# Patient Record
Sex: FEMALE | Race: WHITE | HISPANIC OR LATINO | URBAN - METROPOLITAN AREA
[De-identification: names, ages, dates, MRNs, and addresses within clinical notes are randomized per-mention and may not be internally consistent; named-entity substitution may affect disease eponyms.]

---

## 2017-03-03 ENCOUNTER — APPOINTMENT (RX ONLY)
Dept: URBAN - METROPOLITAN AREA CLINIC 349 | Facility: CLINIC | Age: 37
Setting detail: DERMATOLOGY
End: 2017-03-03

## 2017-03-03 DIAGNOSIS — L20.89 OTHER ATOPIC DERMATITIS: ICD-10-CM

## 2017-03-03 DIAGNOSIS — L21.8 OTHER SEBORRHEIC DERMATITIS: ICD-10-CM

## 2017-03-03 DIAGNOSIS — D22 MELANOCYTIC NEVI: ICD-10-CM

## 2017-03-03 PROBLEM — D22.5 MELANOCYTIC NEVI OF TRUNK: Status: ACTIVE | Noted: 2017-03-03

## 2017-03-03 PROBLEM — L30.9 DERMATITIS, UNSPECIFIED: Status: ACTIVE | Noted: 2017-03-03

## 2017-03-03 PROBLEM — J30.1 ALLERGIC RHINITIS DUE TO POLLEN: Status: ACTIVE | Noted: 2017-03-03

## 2017-03-03 PROCEDURE — ? SHAVE REMOVAL

## 2017-03-03 PROCEDURE — 11300 SHAVE SKIN LESION 0.5 CM/<: CPT

## 2017-03-03 PROCEDURE — ? TREATMENT REGIMEN

## 2017-03-03 PROCEDURE — ? COUNSELING

## 2017-03-03 PROCEDURE — 99242 OFF/OP CONSLTJ NEW/EST SF 20: CPT | Mod: 25

## 2017-03-03 PROCEDURE — A4550 SURGICAL TRAYS: HCPCS

## 2017-03-03 PROCEDURE — ? PRESCRIPTION

## 2017-03-03 RX ORDER — BETAMETHASONE DIPROPIONATE 0.5 MG/G
SPRAY TOPICAL
Qty: 1 | Refills: 2 | Status: ERX | COMMUNITY
Start: 2017-03-03

## 2017-03-03 RX ADMIN — BETAMETHASONE DIPROPIONATE: 0.5 SPRAY TOPICAL at 00:00

## 2017-03-03 ASSESSMENT — LOCATION SIMPLE DESCRIPTION DERM
LOCATION SIMPLE: RIGHT UPPER BACK
LOCATION SIMPLE: LEFT TEMPLE
LOCATION SIMPLE: ABDOMEN
LOCATION SIMPLE: RIGHT FOREHEAD
LOCATION SIMPLE: LEFT ANTERIOR NECK
LOCATION SIMPLE: LEFT UPPER BACK
LOCATION SIMPLE: SCALP

## 2017-03-03 ASSESSMENT — LOCATION DETAILED DESCRIPTION DERM
LOCATION DETAILED: LEFT MID-UPPER BACK
LOCATION DETAILED: RIGHT CENTRAL PARIETAL SCALP
LOCATION DETAILED: RIGHT INFERIOR LATERAL FOREHEAD
LOCATION DETAILED: RIGHT MID-UPPER BACK
LOCATION DETAILED: LEFT LATERAL TEMPLE
LOCATION DETAILED: LEFT INFERIOR LATERAL NECK
LOCATION DETAILED: LEFT CENTRAL PARIETAL SCALP
LOCATION DETAILED: LEFT SUPERIOR ANTERIOR NECK
LOCATION DETAILED: LEFT RIB CAGE

## 2017-03-03 ASSESSMENT — LOCATION ZONE DERM
LOCATION ZONE: NECK
LOCATION ZONE: SCALP
LOCATION ZONE: FACE
LOCATION ZONE: TRUNK

## 2017-03-03 ASSESSMENT — SEVERITY ASSESSMENT
SEVERITY: MILD
HOW SEVERE IS THIS PATIENT'S CONDITION?: ALMOST CLEAR

## 2017-03-03 NOTE — PROCEDURE: SHAVE REMOVAL
Post-Care Instructions: I reviewed with the patient in detail post-care instructions. Patient is to keep the biopsy site dry overnight, and then apply Vaseline daily until healed. Patient may apply hydrogen peroxide soaks to remove any crusting. After the procedure, the patient was oriented to person, place, and time. Patient denied feeling dizzy, queasy, and and declined further observation after initial 5 minute observation time.
Bill 31652 For Specimen Handling/Conveyance To Laboratory?: no
Anesthesia Type: 1% lidocaine with epinephrine
Accession #: Path cons
Medical Necessity Clause: This procedure was medically necessary because the lesion has a history of change
Billing Type: Third-Party Bill
Render Post-Care Instructions In Note?: yes
Path Notes (To The Dermatopathologist): Please check margins. History of change.
Hemostasis: Aluminum Chloride
Notification Instructions: Patient will be notified of biopsy results. However, patient instructed to call the office if not contacted within 2 weeks.
Anesthesia Volume In Cc: 0.4
Detail Level: Detailed
Wound Care: Vaseline
Biopsy Method: Dermablade
X Size Of Lesion In Cm (Optional): 0
Consent was obtained from the patient. The risks and benefits to therapy were discussed in detail. Specifically, the risks of infection, scarring, bleeding, prolonged wound healing, incomplete removal, allergy to anesthesia, nerve injury and recurrence were addressed. Prior to the procedure, the treatment site was clearly identified and confirmed by the patient. All components of Universal Protocol/PAUSE Rule completed.
Medical Necessity Information: It is in your best interest to select a reason for this procedure from the list below. All of these items fulfill various CMS LCD requirements except the new and changing color options.

## 2017-03-03 NOTE — PROCEDURE: TREATMENT REGIMEN
Detail Level: Detailed
Samples Given: Sernivo spray
Samples Given: Sernivo spray apply to affected area twice daily x1-2 weeks as needed for flares

## 2017-06-29 ENCOUNTER — RX ONLY (OUTPATIENT)
Age: 37
Setting detail: RX ONLY
End: 2017-06-29

## 2017-06-29 RX ORDER — CLOBETASOL PROPIONATE 0.5 MG/G
AEROSOL, FOAM TOPICAL
Qty: 1 | Refills: 2 | Status: ERX | COMMUNITY
Start: 2017-06-29

## 2017-08-31 RX ORDER — BETAMETHASONE DIPROPIONATE 0.5 MG/G
SPRAY TOPICAL
Qty: 1 | Refills: 2 | Status: CANCELLED
Stop reason: WASHOUT

## 2018-06-29 ENCOUNTER — ANESTHESIA EVENT (OUTPATIENT)
Dept: SURGERY | Age: 38
End: 2018-06-29
Payer: COMMERCIAL

## 2018-06-29 ENCOUNTER — ANESTHESIA (OUTPATIENT)
Dept: SURGERY | Age: 38
End: 2018-06-29
Payer: COMMERCIAL

## 2018-06-29 ENCOUNTER — HOSPITAL ENCOUNTER (OUTPATIENT)
Age: 38
Setting detail: OUTPATIENT SURGERY
Discharge: HOME | End: 2018-06-29
Attending: ORTHOPAEDIC SURGERY | Admitting: ORTHOPAEDIC SURGERY
Payer: COMMERCIAL

## 2018-06-29 VITALS
TEMPERATURE: 97.2 F | SYSTOLIC BLOOD PRESSURE: 134 MMHG | WEIGHT: 108 LBS | OXYGEN SATURATION: 96 % | HEART RATE: 107 BPM | RESPIRATION RATE: 16 BRPM | DIASTOLIC BLOOD PRESSURE: 71 MMHG | BODY MASS INDEX: 18.54 KG/M2

## 2018-06-29 LAB — HCG UR QL: NEGATIVE

## 2018-06-29 PROCEDURE — 77030019605: Performed by: ORTHOPAEDIC SURGERY

## 2018-06-29 PROCEDURE — 77030031139 HC SUT VCRL2 J&J -A: Performed by: ORTHOPAEDIC SURGERY

## 2018-06-29 PROCEDURE — 77030011640 HC PAD GRND REM COVD -A: Performed by: ORTHOPAEDIC SURGERY

## 2018-06-29 PROCEDURE — A4565 SLINGS: HCPCS | Performed by: ORTHOPAEDIC SURGERY

## 2018-06-29 PROCEDURE — 76210000020 HC REC RM PH II FIRST 0.5 HR: Performed by: ORTHOPAEDIC SURGERY

## 2018-06-29 PROCEDURE — 76942 ECHO GUIDE FOR BIOPSY: CPT | Performed by: ORTHOPAEDIC SURGERY

## 2018-06-29 PROCEDURE — 74011250636 HC RX REV CODE- 250/636: Performed by: ANESTHESIOLOGY

## 2018-06-29 PROCEDURE — 76010000161 HC OR TIME 1 TO 1.5 HR INTENSV-TIER 1: Performed by: ORTHOPAEDIC SURGERY

## 2018-06-29 PROCEDURE — 74011000250 HC RX REV CODE- 250

## 2018-06-29 PROCEDURE — 76060000033 HC ANESTHESIA 1 TO 1.5 HR: Performed by: ORTHOPAEDIC SURGERY

## 2018-06-29 PROCEDURE — 77030004453 HC BUR SHV STRY -B: Performed by: ORTHOPAEDIC SURGERY

## 2018-06-29 PROCEDURE — 77030038020 HC MANFLD NEPTUNE STRY -B: Performed by: ORTHOPAEDIC SURGERY

## 2018-06-29 PROCEDURE — 77030018836 HC SOL IRR NACL ICUM -A: Performed by: ORTHOPAEDIC SURGERY

## 2018-06-29 PROCEDURE — 77030006891 HC BLD SHV RESECT STRY -B: Performed by: ORTHOPAEDIC SURGERY

## 2018-06-29 PROCEDURE — 77030003602 HC NDL NRV BLK BBMI -B: Performed by: ANESTHESIOLOGY

## 2018-06-29 PROCEDURE — 74011250637 HC RX REV CODE- 250/637: Performed by: ANESTHESIOLOGY

## 2018-06-29 PROCEDURE — 77030032490 HC SLV COMPR SCD KNE COVD -B: Performed by: ORTHOPAEDIC SURGERY

## 2018-06-29 PROCEDURE — 74011250636 HC RX REV CODE- 250/636: Performed by: ORTHOPAEDIC SURGERY

## 2018-06-29 PROCEDURE — 76210000006 HC OR PH I REC 0.5 TO 1 HR: Performed by: ORTHOPAEDIC SURGERY

## 2018-06-29 PROCEDURE — 77030027384 HC PRB ARTHSCP SERFAS STRY -C: Performed by: ORTHOPAEDIC SURGERY

## 2018-06-29 PROCEDURE — 74011250636 HC RX REV CODE- 250/636

## 2018-06-29 PROCEDURE — 77030002933 HC SUT MCRYL J&J -A: Performed by: ORTHOPAEDIC SURGERY

## 2018-06-29 PROCEDURE — 77030003666 HC NDL SPINAL BD -A: Performed by: ORTHOPAEDIC SURGERY

## 2018-06-29 PROCEDURE — 81025 URINE PREGNANCY TEST: CPT

## 2018-06-29 PROCEDURE — 76010010054 HC POST OP PAIN BLOCK: Performed by: ORTHOPAEDIC SURGERY

## 2018-06-29 PROCEDURE — 77030020143 HC AIRWY LARYN INTUB CGAS -A: Performed by: ANESTHESIOLOGY

## 2018-06-29 PROCEDURE — 77030016570 HC BLNKT BAIR HGGR 3M -B: Performed by: ANESTHESIOLOGY

## 2018-06-29 RX ORDER — ONDANSETRON 2 MG/ML
INJECTION INTRAMUSCULAR; INTRAVENOUS AS NEEDED
Status: DISCONTINUED | OUTPATIENT
Start: 2018-06-29 | End: 2018-06-29 | Stop reason: HOSPADM

## 2018-06-29 RX ORDER — LIDOCAINE HYDROCHLORIDE 20 MG/ML
INJECTION, SOLUTION EPIDURAL; INFILTRATION; INTRACAUDAL; PERINEURAL AS NEEDED
Status: DISCONTINUED | OUTPATIENT
Start: 2018-06-29 | End: 2018-06-29 | Stop reason: HOSPADM

## 2018-06-29 RX ORDER — SODIUM CHLORIDE, SODIUM LACTATE, POTASSIUM CHLORIDE, CALCIUM CHLORIDE 600; 310; 30; 20 MG/100ML; MG/100ML; MG/100ML; MG/100ML
75 INJECTION, SOLUTION INTRAVENOUS CONTINUOUS
Status: DISCONTINUED | OUTPATIENT
Start: 2018-06-29 | End: 2018-06-29 | Stop reason: HOSPADM

## 2018-06-29 RX ORDER — ONDANSETRON 2 MG/ML
4 INJECTION INTRAMUSCULAR; INTRAVENOUS ONCE
Status: COMPLETED | OUTPATIENT
Start: 2018-06-29 | End: 2018-06-29

## 2018-06-29 RX ORDER — CEFAZOLIN SODIUM/WATER 2 G/20 ML
2 SYRINGE (ML) INTRAVENOUS
Status: COMPLETED | OUTPATIENT
Start: 2018-06-29 | End: 2018-06-29

## 2018-06-29 RX ORDER — FENTANYL CITRATE 50 UG/ML
INJECTION, SOLUTION INTRAMUSCULAR; INTRAVENOUS AS NEEDED
Status: DISCONTINUED | OUTPATIENT
Start: 2018-06-29 | End: 2018-06-29 | Stop reason: HOSPADM

## 2018-06-29 RX ORDER — SODIUM CHLORIDE 0.9 % (FLUSH) 0.9 %
5-10 SYRINGE (ML) INJECTION AS NEEDED
Status: DISCONTINUED | OUTPATIENT
Start: 2018-06-29 | End: 2018-06-29 | Stop reason: HOSPADM

## 2018-06-29 RX ORDER — NALOXONE HYDROCHLORIDE 0.4 MG/ML
0.2 INJECTION, SOLUTION INTRAMUSCULAR; INTRAVENOUS; SUBCUTANEOUS AS NEEDED
Status: DISCONTINUED | OUTPATIENT
Start: 2018-06-29 | End: 2018-06-29 | Stop reason: HOSPADM

## 2018-06-29 RX ORDER — FAMOTIDINE 20 MG/1
20 TABLET, FILM COATED ORAL ONCE
Status: COMPLETED | OUTPATIENT
Start: 2018-06-29 | End: 2018-06-29

## 2018-06-29 RX ORDER — PROPOFOL 10 MG/ML
INJECTION, EMULSION INTRAVENOUS AS NEEDED
Status: DISCONTINUED | OUTPATIENT
Start: 2018-06-29 | End: 2018-06-29 | Stop reason: HOSPADM

## 2018-06-29 RX ORDER — EPINEPHRINE 1 MG/ML
INJECTION, SOLUTION, CONCENTRATE INTRAVENOUS AS NEEDED
Status: DISCONTINUED | OUTPATIENT
Start: 2018-06-29 | End: 2018-06-29 | Stop reason: HOSPADM

## 2018-06-29 RX ORDER — DEXAMETHASONE SODIUM PHOSPHATE 4 MG/ML
INJECTION, SOLUTION INTRA-ARTICULAR; INTRALESIONAL; INTRAMUSCULAR; INTRAVENOUS; SOFT TISSUE AS NEEDED
Status: DISCONTINUED | OUTPATIENT
Start: 2018-06-29 | End: 2018-06-29 | Stop reason: HOSPADM

## 2018-06-29 RX ORDER — MIDAZOLAM HYDROCHLORIDE 1 MG/ML
2 INJECTION, SOLUTION INTRAMUSCULAR; INTRAVENOUS
Status: COMPLETED | OUTPATIENT
Start: 2018-06-29 | End: 2018-06-29

## 2018-06-29 RX ORDER — LIDOCAINE HYDROCHLORIDE 10 MG/ML
0.1 INJECTION INFILTRATION; PERINEURAL AS NEEDED
Status: DISCONTINUED | OUTPATIENT
Start: 2018-06-29 | End: 2018-06-29 | Stop reason: HOSPADM

## 2018-06-29 RX ORDER — KETOROLAC TROMETHAMINE 30 MG/ML
INJECTION, SOLUTION INTRAMUSCULAR; INTRAVENOUS AS NEEDED
Status: DISCONTINUED | OUTPATIENT
Start: 2018-06-29 | End: 2018-06-29 | Stop reason: HOSPADM

## 2018-06-29 RX ADMIN — MIDAZOLAM HYDROCHLORIDE 2 MG: 1 INJECTION, SOLUTION INTRAMUSCULAR; INTRAVENOUS at 06:44

## 2018-06-29 RX ADMIN — ONDANSETRON 4 MG: 2 INJECTION INTRAMUSCULAR; INTRAVENOUS at 09:05

## 2018-06-29 RX ADMIN — FENTANYL CITRATE 25 MCG: 50 INJECTION, SOLUTION INTRAMUSCULAR; INTRAVENOUS at 08:08

## 2018-06-29 RX ADMIN — LIDOCAINE HYDROCHLORIDE 20 MG: 20 INJECTION, SOLUTION EPIDURAL; INFILTRATION; INTRACAUDAL; PERINEURAL at 07:22

## 2018-06-29 RX ADMIN — ONDANSETRON 4 MG: 2 INJECTION INTRAMUSCULAR; INTRAVENOUS at 07:38

## 2018-06-29 RX ADMIN — KETOROLAC TROMETHAMINE 30 MG: 30 INJECTION, SOLUTION INTRAMUSCULAR; INTRAVENOUS at 08:22

## 2018-06-29 RX ADMIN — FENTANYL CITRATE 25 MCG: 50 INJECTION, SOLUTION INTRAMUSCULAR; INTRAVENOUS at 08:13

## 2018-06-29 RX ADMIN — FAMOTIDINE 20 MG: 20 TABLET, FILM COATED ORAL at 06:41

## 2018-06-29 RX ADMIN — FENTANYL CITRATE 50 MCG: 50 INJECTION, SOLUTION INTRAMUSCULAR; INTRAVENOUS at 07:22

## 2018-06-29 RX ADMIN — PROPOFOL 200 MG: 10 INJECTION, EMULSION INTRAVENOUS at 07:22

## 2018-06-29 RX ADMIN — DEXAMETHASONE SODIUM PHOSPHATE 4 MG: 4 INJECTION, SOLUTION INTRA-ARTICULAR; INTRALESIONAL; INTRAMUSCULAR; INTRAVENOUS; SOFT TISSUE at 07:38

## 2018-06-29 RX ADMIN — Medication 2 G: at 07:14

## 2018-06-29 RX ADMIN — SODIUM CHLORIDE, SODIUM LACTATE, POTASSIUM CHLORIDE, AND CALCIUM CHLORIDE 75 ML/HR: 600; 310; 30; 20 INJECTION, SOLUTION INTRAVENOUS at 06:00

## 2018-06-29 NOTE — ANESTHESIA PREPROCEDURE EVALUATION
Anesthetic History     PONV          Review of Systems / Medical History  Patient summary reviewed, nursing notes reviewed and pertinent labs reviewed    Pulmonary  Within defined limits                 Neuro/Psych   Within defined limits           Cardiovascular                  Exercise tolerance: >4 METS     GI/Hepatic/Renal  Within defined limits              Endo/Other  Within defined limits           Other Findings              Physical Exam    Airway  Mallampati: I  TM Distance: 4 - 6 cm  Neck ROM: normal range of motion   Mouth opening: Normal     Cardiovascular    Rhythm: regular           Dental  No notable dental hx       Pulmonary  Breath sounds clear to auscultation               Abdominal         Other Findings            Anesthetic Plan    ASA: 1  Anesthesia type: general      Post-op pain plan if not by surgeon: peripheral nerve block single    Induction: Intravenous  Anesthetic plan and risks discussed with: Patient

## 2018-06-29 NOTE — OP NOTES
[facility] REPORT    Hernandez Garrett  MR#: [id number]  : 1980  ACCOUNT #: [de-identified]   DATE OF SERVICE: 2018    PREOPERATIVE DIAGNOSIS:  Acromioclavicular joint arthritis with possible rotator cuff tear. POSTOPERATIVE DIAGNOSES:   1. Acromioclavicular joint arthritis. 2.  Small bursal tear of the rotator cuff. 3.  Impingement of the right shoulder. PROCEDURES PERFORMED:   1. Arthroscopic resection of distal clavicle. 2.  Debridement of a very small bursal tear of the right shoulder. 3.  Subacromial decompression. SURGEON:  Jj Lopez MD    ASSISTANT:  None. ANESTHESIA:  General.    ESTIMATED BLOOD LOSS:  Minimal.    SPECIMENS REMOVED:  None. COMPLICATIONS:  None. IMPLANTS:  None. DESCRIPTION OF PROCEDURE:  After an adequate level of general anesthesia was obtained, the right arm was prepped and draped in the usual sterile fashion. She had full range of motion and had a block per Anesthesia for postoperative pain management, and received antibiotics. The joint was distended posteriorly. The patient had marked pain at her [facility] joint with very prominent AC joint. She was somewhat thin and had somewhat of a prominent distal clavicle and pain at the [facility] joint. The joint was distended posteriorly with saline. Arthroscope introduced. The articular surface looked fine. An anterior portal was made and the biceps anchor looked fine. There were no labral lesions. The arthroscope was placed in the subacromial space. A lateral portal was made. Patient had some hooking of the acromion and a decompression was performed. The rotator cuff was well visualized and intact. She had some small bursal tearing, but overall this area was debrided, but she had excellent integrity of the rotator cuff. The main problem was on the distal clavicle. She was very thin and she had a prominent end of the distal clavicle.   Soft tissue was removed circumferentially. She had somewhat of an oblique orientation of the distal clavicle, particularly on the acromial side. Initially, with the arthroscope in the posterior portal and through the lateral portal, bone was removed inferiorly. The proper depth was determined. Then, through the anterior portal with pressure applied superiorly, the bone was removed from both sides of the joint. A small incision was made superiorly to use as a windshield wiper method to remove some of the other spurs. Arthroscope was placed through the 3 portals and it looked as though there was an ample amount of bone removed, particularly anteriorly and posteriorly. The wound was then copiously irrigated. The portals closed with Monocryl and Monocryl subcuticular stitches, placed Steri-Strips and sterile dressings applied. The patient was placed in a sling. She tolerated the procedure well.       MD Roseanne Caraballo / OZZIE  D: 06/29/2018 08:26     T: 06/29/2018 08:54  JOB #: [id number]  CC: Keyshawn Kaur MD

## 2018-06-29 NOTE — ANESTHESIA POSTPROCEDURE EVALUATION
Post-Anesthesia Evaluation and Assessment    Patient: Sarah Sheriff MRN: [id number]  SSN: [id number]    YOB: 1980  Age: 41 y.o. Sex: female       Cardiovascular Function/Vital Signs  Visit Vitals    /71    Pulse (!) 107    Temp 36.2 °C (97.2 °F)    Resp 16    Wt 49 kg (108 lb)    SpO2 96%    BMI 18.54 kg/m2       Patient is status post general anesthesia for Procedure(s):  RIGHT SHOULDER ARTHROSCOPY DISTAL CLAVICLE RESECTION DEBRIDEMENT ROTATOR CUFF TEAR. Nausea/Vomiting: None    Postoperative hydration reviewed and adequate. Pain:  Pain Scale 1: Numeric (0 - 10) (06/29/18 0834)  Pain Intensity 1: 0 (06/29/18 0834)   Managed    Neurological Status:   Neuro (WDL): Exceptions to [facility] (06/29/18 3272)  Neuro  Neurologic State: Anesthetized; Appropriate for age;Drowsy (06/29/18 0834)  RUE Motor Response: Floppy (06/29/18 0834)   At baseline    Mental Status and Level of Consciousness: Arousable    Pulmonary Status:   O2 Device: Nasal cannula (06/29/18 5652)   Adequate oxygenation and airway patent    Complications related to anesthesia: None    Post-anesthesia assessment completed.  No concerns    Signed By: Eze Sow MD     June 29, 2018

## 2018-06-29 NOTE — IP AVS SNAPSHOT
Rudy Fernandez 
 
 
 [facility] [address] [facility] 
[phone] Patient: Hugh Estes MRN: [id number] LVX:0/70/6706 About your hospitalization You were admitted on:  June 29, 2018 You last received care in the:  [facility] OP PACU You were discharged on:  June 29, 2018 Why you were hospitalized Your primary diagnosis was:  Not on File Follow-up Information None Discharge Orders None A check addy indicates which time of day the medication should be taken. My Medications ASK your doctor about these medications Instructions Each Dose to Equal  
 Morning Noon Evening Bedtime EUCRISA 2 % Oint Generic drug:  crisaborole Your last dose was: Your next dose is:    
   
   
 by Apply Externally route. ibuprofen 800 mg tablet Commonly known as:  MOTRIN Your last dose was: Your next dose is: Take 800 mg by mouth every six (6) hours as needed for Pain. 800 mg  
    
   
   
   
  
 levonorgestrel-ethinyl estradiol 0.1-20 mg-mcg Tab Commonly known as:  Jim Linton (28) Your last dose was: Your next dose is: Take 1 Tab by mouth daily. 1 Tab SERNIVO 0.05 % Sprp Generic drug:  betamethasone dipropionate Your last dose was: Your next dose is:    
   
   
 by Apply Externally route. ZyrTEC 10 mg Cap Generic drug:  Cetirizine Your last dose was: Your next dose is: Take  by mouth as needed. Discharge Instructions S 
ACTIVITY 
- Use sling at all times while numb. . Must sleep in sling until full sensation and control returns. May remove sling only when sensation and control returns. Range of motion as tolerated - Limited activity today; increase activity tomorrow, but no vigorous exercise. .Protect arm while numb. -  Keep dressing dry and intact for 3 days. May remove and cover with waterproof band aids to shower. No tub baths for 2 weeks. - Physical therapy as scheduled. - Ice pack to area DIET 
- Clear liquids until no nausea or vomiting. 
- Advance to regular diet as tolerated. Avoid greasy and spicy food today to reduce nausea. PAIN 
- Expect a moderate amount of pain. Begin taking pain pills when sensation returns or at dinner/ bedtime even if still numb. - Take pain medication as directed by your doctor. If no prescription for pain is sent home with you, take the appropriate dose of your commonly used pain medication. 
- Call your doctor if pain is NOT relieved by medication. - DO NOT take aspirin or blood thinners until directed by your doctor. Take pills with food to reduce nausea May cause constipation Use stool softener DRESSING CARE 
- Keep dressing clean and dry for 3 days. May remove and cover with waterproof band aids to shower. No tub baths for 2 weeks. FOLLOW UP PHONE CALL 
- Call will be made by nursing staff. - If you have any problems or concerns, call your doctor as needed. CALL YOUR DOCTOR IF 
- Excessive bleeding that does not stop after holding mild pressure over the area for 15 minutes. - Any color, temperature, or sensation changes in the operative arm/hand. 
- Temperature of 101F degrees or above. - Green or yellow, smelly drainage. 
- Nausea and vomiting that does not stop by bedtime. AFTER ANESTHESIA 
 - For the next 24 hours:  DO NOT Drive; Drink Alcoholic Beverages; Make important decisions. 
- Be aware of dizziness following anesthesia and while taking pain medication. 
- Plan to stay within one hour's drive of the hospital. 
 
Neno Henderson DATE/TIME__Keep scheduled appointment ___________________________________ YOUR DOCTOR'S PHONE NUMBER____[phone]__________________ Introducing [facility]! Dear [facility]: Thank you for requesting a [employer] account. Our records indicate that you already have an active [employer] account. You can access your account anytime at [email or web address] Did you know that you can access your hospital and ER discharge instructions at any time in [employer]? You can also review all of your test results from your hospital stay or ER visit. Additional Information If you have questions, please visit the Frequently Asked Questions section of the [employer] website at [email or web address] Remember, [employer] is NOT to be used for urgent needs. For medical emergencies, dial 911. Now available from your iPhone and Android! Introducing Tony Minor As a [employer] patient, I wanted to make you aware of our electronic visit tool called Tony Iván. [email or web address] allows you to connect within minutes with a medical provider 24 hours a day, seven days a week via a mobile device or tablet or logging into a secure website from your computer. You can access Tony Nievesfin from anywhere in the [place]. A virtual visit might be right for you when you have a simple condition and feel like you just dont want to get out of bed, or cant get away from work for an appointment, when your regular [place] provider is not available (evenings, weekends or holidays), or when youre out of town and need minor care. Electronic visits cost only $49 and if the [email or web address] provider determines a prescription is needed to treat your condition, one can be electronically transmitted to a nearby pharmacy*. Please take a moment to enroll today if you have not already done so. The enrollment process is free and takes just a few minutes. To enroll, please download the [email or web address] ariana to your tablet or phone, or visit [email or web address] to enroll on your computer. And, as an [address] [place] [address] patient with a [employer] account, the results of your visits will be scanned into your electronic medical record and your primary care provider will be able to view the scanned results. We urge you to continue to see your regular [facility] provider for your ongoing medical care. And while your primary care provider may not be the one available when you seek a Tony Minor virtual visit, the peace of mind you get from getting a real diagnosis real time can be priceless. For more information on Tony Nievesfin, view our Frequently Asked Questions (FAQs) at [email or web address]. Sincerely, 
 
Mary Nelson MD 
Chief Medical Officer Vinay Davis *:  certain medications cannot be prescribed via Tony Ezequielfin Providers Seen During Your Hospitalization Provider Specialty Primary office phone Myla Terry MD Orthopedic Surgery [phone] Your Primary Care Physician (PCP) Primary Care Physician Office Phone Office Fax NONE ** None ** ** None ** You are allergic to the following Allergen Reactions Codeine Shortness of Breath Imitrex (Sumatriptan Succinate) Hives Shortness of Breath Duradrin (Abzhmrs-Mngqqemjs-Gdckmkvnvlod) Hives Erythromycin Hives Morphine Hives Percocet (Oxycodone-Acetaminophen) Hives Tetracycline Hives Ultram (Tramadol) Hives Vicodin (Hydrocodone-Acetaminophen) Hives Recent Documentation Weight BMI OB Status Smoking Status 49 kg 18.54 kg/m2 Having regular periods Never Smoker Emergency Contacts Name Discharge Info Relation Home Work Mobile Surjit Fernandes  Spouse [3] [phone] Patient Belongings  The following personal items are in your possession at time of discharge: 
  Dental Appliances: None         Home Medications: None   Jewelry: None Clothing: Footwear, Undergarments, Pants, Shirt    Other Valuables: None Please provide this summary of care documentation to your next provider. Signatures-by signing, you are acknowledging that this After Visit Summary has been reviewed with you and you have received a copy. Patient Signature:  ____________________________________________________________ Date:  ____________________________________________________________  
  
Dontrell Ala Provider Signature:  ____________________________________________________________ Date:  ____________________________________________________________

## 2018-06-29 NOTE — H&P
Outpatient Surgery History and Physical:  Zhanna Travis was seen and examined. CHIEF COMPLAINT:    r shoulder . PE:     Visit Vitals    /75 (BP 1 Location: Left arm, BP Patient Position: Supine)    Pulse 94    Temp 98.4 °F (36.9 °C)    Resp 18    Wt 49 kg (108 lb)    LMP 06/19/2018    SpO2 99%    BMI 18.54 kg/m2       Heart:   Regular rhythm      Lungs:  Are clear      Past Medical History:    Patient Active Problem List    Diagnosis    Routine gynecological examination    Fatigue    Frequent headaches       Surgical History:   Past Surgical History:   Procedure Laterality Date    HX BREAST AUGMENTATION      HX KNEE ARTHROSCOPY Left     x 2    HX ORTHOPAEDIC      HX WISDOM TEETH EXTRACTION         Social History: Patient  reports that she has never smoked. She has never used smokeless tobacco. She reports that she drinks alcohol. She reports that she does not use illicit drugs. Family History:   Family History   Problem Relation Age of Onset    Migraines Mother     Breast Cancer Maternal Grandmother     Stroke Maternal Grandmother     Deep Vein Thrombosis Paternal Grandmother     Diabetes Paternal Grandfather    Belden Marrero Bladder Disease Paternal Grandfather        Allergies: Reviewed per EMR  Allergies   Allergen Reactions    Codeine Shortness of Breath    Imitrex [Sumatriptan Succinate] Hives and Shortness of Breath    Duradrin [Kiqlrhb-Guaqqfgaf-Adaoegvvxrcu] Hives    Erythromycin Hives    Morphine Hives    Percocet [Oxycodone-Acetaminophen] Hives    Tetracycline Hives    Ultram [Tramadol] Hives    Vicodin [Hydrocodone-Acetaminophen] Hives       Medications:    No current facility-administered medications on file prior to encounter. Current Outpatient Prescriptions on File Prior to Encounter   Medication Sig    crisaborole (EUCRISA) 2 % oint by Apply Externally route.  betamethasone dipropionate (SERNIVO) 0.05 % sprp by Apply Externally route.     levonorgestrel-ethinyl estradiol (LUTERA, 28,) 0.1-20 mg-mcg tab Take 1 Tab by mouth daily. (Patient taking differently: Take 1 Tab by mouth nightly.)    Cetirizine (ZYRTEC) 10 mg cap Take  by mouth as needed. The surgery is planned for the  Shoulder . History and physical has been reviewed. The patient has been examined. There have been no significant clinical changes since the completion of the originally dated History and Physical.  Patient identified by surgeon; surgical site was confirmed by patient and surgeon. The patient is here today for outpatient surgery. I have examined the patient, no changes are noted in the patient's medical status. Necessity for the procedure/care is still present and the history and physical above is current. See the office notes for the full long term history of the problem. Please see the recent office notes for the musculoskeletal examination.     Signed By: Nash Rossi MD     June 29, 2018 6:47 AM

## 2018-06-29 NOTE — ANESTHESIA PROCEDURE NOTES
Interscalene Block    Start time: 6/29/2018 6:44 AM  End time: 6/29/2018 6:48 AM  Performed by: Masood Villalta  Authorized by: Masood Villalta       Pre-procedure: Indications: at surgeon's request and post-op pain management    Preanesthetic Checklist: patient identified, risks and benefits discussed, site marked, timeout performed, anesthesia consent given and patient being monitored      Block Type:   Block Type:   Interscalene  Laterality:  Right  Monitoring:  Standard ASA monitoring, responsive to questions, oxygen, continuous pulse ox, frequent vital sign checks and heart rate  Injection Technique:  Single shot  Procedures: ultrasound guided    Patient Position: seated  Prep: chlorhexidine    Location:  Interscalene  Needle Type:  Stimuplex  Needle Gauge:  22 G  Needle Localization:  Ultrasound guidance  Medication Injected:  0.375%  bupivacaine  Adds:  Epi 1:400K  Volume (mL):  20  mepivacaine  Volume (mL):  5    Assessment:  Number of attempts:  1  Injection Assessment:  Incremental injection every 5 mL, negative aspiration for CSF, no paresthesia, ultrasound image on chart, no intravascular symptoms, negative aspiration for blood and local visualized surrounding nerve on ultrasound  Patient tolerance:  Patient tolerated the procedure well with no immediate complications

## 2018-06-29 NOTE — IP AVS SNAPSHOT
[address] 
[phone] Patient: Skye Ramey MRN: [id number] CWP:9/30/7740 A check addy indicates which time of day the medication should be taken. My Medications ASK your doctor about these medications Instructions Each Dose to Equal  
 Morning Noon Evening Bedtime EUCRISA 2 % Oint Generic drug:  crisaborole Your last dose was: Your next dose is:    
   
   
 by Apply Externally route. ibuprofen 800 mg tablet Commonly known as:  MOTRIN Your last dose was: Your next dose is: Take 800 mg by mouth every six (6) hours as needed for Pain. 800 mg  
    
   
   
   
  
 levonorgestrel-ethinyl estradiol 0.1-20 mg-mcg Tab Commonly known as:  Avelina Sauer (28) Your last dose was: Your next dose is: Take 1 Tab by mouth daily. 1 Tab SERNIVO 0.05 % Sprp Generic drug:  betamethasone dipropionate Your last dose was: Your next dose is:    
   
   
 by Apply Externally route. ZyrTEC 10 mg Cap Generic drug:  Cetirizine Your last dose was: Your next dose is: Take  by mouth as needed.

## 2018-06-29 NOTE — IP AVS SNAPSHOT
Summary of Care Report The Summary of Care report has been created to help improve care coordination. Users with access to [employer] or [address] (Web-based application) may access additional patient information including the Discharge Summary. If you are not currently a [address] user and need more information, please call the number listed below in the [place] [address] section and ask to be connected with Medical Records. Facility Information Name Address Phone [address] [address] [address] [phone] Patient Information Patient Name Sex  Venkat Silveira ([id number]) Female 1980 Discharge Information Admitting Provider Service Area Unit Bennie Flynn MD / [phone] [facility] / [phone] Discharge Provider Discharge Date/Time Discharge Disposition Destination (none) (none) (none) (none) Patient Language Language ENGLISH [13] Hospital Problems as of 2018  Reviewed: 2018  3:56 PM by Kirk Lee MD  
 None Non-Hospital Problems as of 2018  Reviewed: 2018  3:56 PM by Kikr Lee MD  
  
  
  
 Class Noted - Resolved Last Modified Active Problems Routine gynecological examination  2015 - Present 2015 by Kirk Lee MD  
  Entered by Kirk Lee MD  
  Fatigue  2015 - Present 2015 by Kirk Lee MD  
  Entered by Kirk Lee MD  
  Frequent headaches  2015 - Present 2015 by Kirk Lee MD  
  Entered by Kirk Lee MD  
  
You are allergic to the following Allergen Reactions Codeine Shortness of Breath Imitrex (Sumatriptan Succinate) Hives Shortness of Breath Duradrin (Tgqenqe-Drnqxxbva-Tmccxowxggat) Hives Erythromycin Hives Morphine Hives Percocet (Oxycodone-Acetaminophen) Hives Tetracycline Hives Ultram (Tramadol) Hives Vicodin (Hydrocodone-Acetaminophen) Hives Current Discharge Medication List  
  
ASK your doctor about these medications Dose & Instructions Dispensing Information Comments EUCRISA 2 % Oint Generic drug:  crisaborole  
 by Apply Externally route. Refills:  0  
   
 ibuprofen 800 mg tablet Commonly known as:  MOTRIN Dose:  800 mg Take 800 mg by mouth every six (6) hours as needed for Pain. Refills:  0  
   
 levonorgestrel-ethinyl estradiol 0.1-20 mg-mcg Tab Commonly known as:  Monty Schafer (28) Dose:  1 Tab Take 1 Tab by mouth daily. Quantity:  3 Package Refills:  4 SERNIVO 0.05 % Sprp Generic drug:  betamethasone dipropionate  
 by Apply Externally route. Refills:  0 ZyrTEC 10 mg Cap Generic drug:  Cetirizine Take  by mouth as needed. Refills:  0 Surgery Information ID Date/Time Status Primary Surgeon All Procedures Location [id number] 6/29/2018 0730 Unposted Ann Marie Murillo MD RIGHT SHOULDER ARTHROSCOPY DISTAL CLAVICLE RESECTION DEBRIDEMENT ROTATOR CUFF TEAR AMRIT Johnson Follow-up Information None Discharge Instructions S 
ACTIVITY 
- Use sling at all times while numb. . Must sleep in sling until full sensation and control returns. May remove sling only when sensation and control returns. Range of motion as tolerated - Limited activity today; increase activity tomorrow, but no vigorous exercise. .Protect arm while numb. -  Keep dressing dry and intact for 3 days. May remove and cover with waterproof band aids to shower. No tub baths for 2 weeks. - Physical therapy as scheduled. - Ice pack to area DIET 
- Clear liquids until no nausea or vomiting. 
- Advance to regular diet as tolerated. Avoid greasy and spicy food today to reduce nausea.  
 
PAIN 
 - Expect a moderate amount of pain. Begin taking pain pills when sensation returns or at dinner/ bedtime even if still numb. - Take pain medication as directed by your doctor. If no prescription for pain is sent home with you, take the appropriate dose of your commonly used pain medication. 
- Call your doctor if pain is NOT relieved by medication. - DO NOT take aspirin or blood thinners until directed by your doctor. Take pills with food to reduce nausea May cause constipation Use stool softener DRESSING CARE 
- Keep dressing clean and dry for 3 days. May remove and cover with waterproof band aids to shower. No tub baths for 2 weeks. FOLLOW UP PHONE CALL 
- Call will be made by nursing staff. - If you have any problems or concerns, call your doctor as needed. CALL YOUR DOCTOR IF 
- Excessive bleeding that does not stop after holding mild pressure over the area for 15 minutes. - Any color, temperature, or sensation changes in the operative arm/hand. 
- Temperature of 101F degrees or above. - Green or yellow, smelly drainage. 
- Nausea and vomiting that does not stop by bedtime. AFTER ANESTHESIA 
 - For the next 24 hours:  DO NOT Drive; Drink Alcoholic Beverages; Make important decisions. 
- Be aware of dizziness following anesthesia and while taking pain medication. 
- Plan to stay within one hour's drive of the hospital. 
 
Geeta Bias DATE/TIME__Keep scheduled appointment ___________________________________ YOUR DOCTOR'S PHONE NUMBER____[phone]__________________ Chart Review Routing History No Routing History on File

## 2018-09-08 ENCOUNTER — APPOINTMENT (RX ONLY)
Dept: URBAN - METROPOLITAN AREA CLINIC 349 | Facility: CLINIC | Age: 38
Setting detail: DERMATOLOGY
End: 2018-09-08

## 2018-09-08 DIAGNOSIS — L21.8 OTHER SEBORRHEIC DERMATITIS: ICD-10-CM

## 2018-09-08 DIAGNOSIS — Z87.2 PERSONAL HISTORY OF DISEASES OF THE SKIN AND SUBCUTANEOUS TISSUE: ICD-10-CM

## 2018-09-08 DIAGNOSIS — L20.89 OTHER ATOPIC DERMATITIS: ICD-10-CM

## 2018-09-08 DIAGNOSIS — D22 MELANOCYTIC NEVI: ICD-10-CM | Status: STABLE

## 2018-09-08 PROCEDURE — ? TREATMENT REGIMEN

## 2018-09-08 PROCEDURE — ? PRESCRIPTION

## 2018-09-08 PROCEDURE — ? COUNSELING

## 2018-09-08 ASSESSMENT — LOCATION ZONE DERM
LOCATION ZONE: SCALP
LOCATION ZONE: ARM
LOCATION ZONE: TRUNK
LOCATION ZONE: FACE
LOCATION ZONE: LEG

## 2018-09-08 ASSESSMENT — LOCATION DETAILED DESCRIPTION DERM
LOCATION DETAILED: LEFT PROXIMAL DORSAL FOREARM
LOCATION DETAILED: LEFT DISTAL POSTERIOR THIGH
LOCATION DETAILED: LEFT CENTRAL PARIETAL SCALP
LOCATION DETAILED: LEFT LATERAL TEMPLE
LOCATION DETAILED: LEFT RIB CAGE
LOCATION DETAILED: RIGHT CENTRAL PARIETAL SCALP
LOCATION DETAILED: RIGHT DISTAL POSTERIOR THIGH
LOCATION DETAILED: RIGHT SUPERIOR MEDIAL UPPER BACK
LOCATION DETAILED: RIGHT INFERIOR LATERAL FOREHEAD
LOCATION DETAILED: RIGHT PROXIMAL DORSAL FOREARM

## 2018-09-08 ASSESSMENT — LOCATION SIMPLE DESCRIPTION DERM
LOCATION SIMPLE: RIGHT POSTERIOR THIGH
LOCATION SIMPLE: LEFT POSTERIOR THIGH
LOCATION SIMPLE: LEFT FOREARM
LOCATION SIMPLE: SCALP
LOCATION SIMPLE: RIGHT FOREARM
LOCATION SIMPLE: RIGHT UPPER BACK
LOCATION SIMPLE: ABDOMEN
LOCATION SIMPLE: LEFT TEMPLE
LOCATION SIMPLE: RIGHT FOREHEAD

## 2018-09-08 NOTE — PROCEDURE: TREATMENT REGIMEN
Samples Given: Sernivo spray
Detail Level: Detailed
Samples Given: Sernivo spray apply to affected area twice daily x1-2 weeks as needed for flares

## 2019-05-15 PROBLEM — G43.101 MIGRAINE WITH AURA AND WITH STATUS MIGRAINOSUS, NOT INTRACTABLE: Status: ACTIVE | Noted: 2019-05-15

## 2020-06-15 ENCOUNTER — HOSPITAL ENCOUNTER (OUTPATIENT)
Dept: MAMMOGRAPHY | Age: 40
Discharge: HOME | End: 2020-06-15
Attending: OBSTETRICS & GYNECOLOGY
Payer: COMMERCIAL

## 2020-06-15 DIAGNOSIS — Z12.31 ENCOUNTER FOR SCREENING MAMMOGRAM FOR BREAST CANCER: ICD-10-CM

## 2020-06-15 DIAGNOSIS — Z01.419 WELL WOMAN EXAM WITH ROUTINE GYNECOLOGICAL EXAM: ICD-10-CM

## 2020-06-15 PROCEDURE — 77067 SCR MAMMO BI INCL CAD: CPT

## 2021-06-28 ENCOUNTER — HOSPITAL ENCOUNTER (OUTPATIENT)
Dept: MAMMOGRAPHY | Age: 41
Discharge: HOME | End: 2021-06-28
Attending: OBSTETRICS & GYNECOLOGY
Payer: COMMERCIAL

## 2021-06-28 DIAGNOSIS — Z12.31 ENCOUNTER FOR SCREENING MAMMOGRAM FOR BREAST CANCER: ICD-10-CM

## 2021-06-28 DIAGNOSIS — Z01.419 WELL WOMAN EXAM WITH ROUTINE GYNECOLOGICAL EXAM: ICD-10-CM

## 2021-06-28 PROCEDURE — 77067 SCR MAMMO BI INCL CAD: CPT

## 2022-03-18 PROBLEM — G43.101 MIGRAINE WITH AURA AND WITH STATUS MIGRAINOSUS, NOT INTRACTABLE: Status: ACTIVE | Noted: 2019-05-15

## 2022-06-22 ENCOUNTER — OFFICE VISIT (OUTPATIENT)
Dept: OBGYN CLINIC | Age: 42
End: 2022-06-22
Payer: COMMERCIAL

## 2022-06-22 VITALS
SYSTOLIC BLOOD PRESSURE: 100 MMHG | DIASTOLIC BLOOD PRESSURE: 60 MMHG | HEIGHT: 64 IN | BODY MASS INDEX: 18.95 KG/M2 | WEIGHT: 111 LBS

## 2022-06-22 DIAGNOSIS — Z11.51 ENCOUNTER FOR SCREENING FOR HUMAN PAPILLOMAVIRUS (HPV): ICD-10-CM

## 2022-06-22 DIAGNOSIS — Z01.419 WELL WOMAN EXAM WITH ROUTINE GYNECOLOGICAL EXAM: Primary | ICD-10-CM

## 2022-06-22 DIAGNOSIS — Z13.89 SCREENING FOR HEMATURIA OR PROTEINURIA: ICD-10-CM

## 2022-06-22 DIAGNOSIS — Z12.31 ENCOUNTER FOR SCREENING MAMMOGRAM FOR MALIGNANT NEOPLASM OF BREAST: ICD-10-CM

## 2022-06-22 DIAGNOSIS — Z13.0 SCREENING, ANEMIA, DEFICIENCY, IRON: ICD-10-CM

## 2022-06-22 DIAGNOSIS — Z12.4 ENCOUNTER FOR SCREENING FOR CERVICAL CANCER: ICD-10-CM

## 2022-06-22 LAB
BILIRUBIN, URINE, POC: NEGATIVE
BLOOD URINE, POC: NEGATIVE
GLUCOSE URINE, POC: NEGATIVE
HEMOGLOBIN, POC: 13.2 G/DL
KETONES, URINE, POC: NEGATIVE
LEUKOCYTE ESTERASE, URINE, POC: NEGATIVE
NITRITE, URINE, POC: NORMAL
PH, URINE, POC: 6 (ref 4.6–8)
PROTEIN,URINE, POC: NEGATIVE
SPECIFIC GRAVITY, URINE, POC: 1.02 (ref 1–1.03)
URINALYSIS CLARITY, POC: CLEAR
URINALYSIS COLOR, POC: YELLOW
UROBILINOGEN, POC: 0.2

## 2022-06-22 PROCEDURE — 81003 URINALYSIS AUTO W/O SCOPE: CPT | Performed by: OBSTETRICS & GYNECOLOGY

## 2022-06-22 PROCEDURE — 99396 PREV VISIT EST AGE 40-64: CPT | Performed by: OBSTETRICS & GYNECOLOGY

## 2022-06-22 PROCEDURE — 85018 HEMOGLOBIN: CPT | Performed by: OBSTETRICS & GYNECOLOGY

## 2022-06-22 RX ORDER — NORETHINDRONE ACETATE AND ETHINYL ESTRADIOL 1MG-20(21)
1 KIT ORAL DAILY
Qty: 3 PACKET | Refills: 4 | Status: SHIPPED | OUTPATIENT
Start: 2022-06-22

## 2022-06-22 NOTE — PROGRESS NOTES
Patient presents today for a routine gynecological examination with complaints as noted in ROS. LMP: Patient's last menstrual period was 2022. Contraception: OCP (estrogen/progesterone)  Social History     Substance and Sexual Activity   Sexual Activity Yes    Partners: Male    Birth control/protection: Pill       OB History:  OB History    Para Term  AB Living   1 1 1 0 0 1   SAB IAB Ectopic Molar Multiple Live Births   0 0 0 0 0 1      # Outcome Date GA Lbr Clive/2nd Weight Sex Delivery Anes PTL Lv   1 Term 2004    F    CALIN       Allergies: Allergies   Allergen Reactions    Codeine Shortness Of Breath    Sumatriptan Hives and Shortness Of Breath    Erythromycin Hives    Hydrocodone-Acetaminophen Hives    Morphine Hives    Oxycodone-Acetaminophen Hives    Tetracycline Hives    Tramadol Hives       Medication History:  Current Outpatient Medications   Medication Sig Dispense Refill    Levocetirizine Dihydrochloride (XYZAL PO) Take by mouth      norethindrone-ethinyl estradiol (JUNEL ) 1-20 MG-MCG per tablet Take 1 tablet by mouth daily 3 packet 4     No current facility-administered medications for this visit.        Past Medical History:  Past Medical History:   Diagnosis Date    Contact dermatitis and other eczema, due to unspecified cause     Headache     migraine    Nausea & vomiting     Ovarian cyst     UTI (urinary tract infection)        Past Surgical History:  Past Surgical History:   Procedure Laterality Date    BREAST SURGERY      IMPLANT BREAST SILICONE/EQ      KNEE ARTHROSCOPY Left     x 2    ORTHOPEDIC SURGERY      OTHER SURGICAL HISTORY  2018    Shoulder Surgery    WISDOM TOOTH EXTRACTION         Social History:  Social History     Socioeconomic History    Marital status:      Spouse name: Not on file    Number of children: Not on file    Years of education: Not on file    Highest education level: Not on file   Occupational History    Not on file   Tobacco Use Smoking status: Never    Smokeless tobacco: Never   Vaping Use    Vaping Use: Never used   Substance and Sexual Activity    Alcohol use: Yes    Drug use: No    Sexual activity: Yes     Partners: Male     Birth control/protection: Pill   Other Topics Concern    Not on file   Social History Narrative    Not on file     Social Determinants of Health     Financial Resource Strain: Not on file   Food Insecurity: Not on file   Transportation Needs: Not on file   Physical Activity: Not on file   Stress: Not on file   Social Connections: Not on file   Intimate Partner Violence: Not on file   Housing Stability: Not on file       Family History:  Family History   Problem Relation Age of Onset    Stroke Maternal Grandmother     Breast Cancer Maternal Grandmother     Deep Vein Thrombosis Paternal Grandmother     Gall Bladder Disease Paternal Grandfather     Migraines Mother     Diabetes Paternal Grandfather        /61   Ht 5' 4\" (1.626 m)   Wt 111 lb (50.3 kg)   LMP 06/08/2022   Breastfeeding No   BMI 19.05 kg/m²      Review of Systems    Physical Exam  Vitals signs reviewed. Constitutional:       General: She is not in acute distress. Appearance: Normal appearance. She is well-developed and normal weight. She is not ill-appearing, toxic-appearing or diaphoretic. HENT:      Head: Normocephalic and atraumatic. Nose: Nose normal.   Eyes:      General: No scleral icterus. Conjunctiva/sclera: Conjunctivae normal.      Pupils: Pupils are equal, round, and reactive to light. Neck:      Musculoskeletal: Normal range of motion and neck supple. Thyroid: No thyromegaly. Vascular: No JVD. Trachea: No tracheal deviation. Cardiovascular:      Rate and Rhythm: Normal rate and regular rhythm. Heart sounds: Normal heart sounds. No murmur. No friction rub. No gallop. Pulmonary:      Effort: Pulmonary effort is normal. No respiratory distress. Breath sounds: Normal breath sounds.  No stridor. No wheezing, rhonchi or rales. Chest:      Chest wall: No tenderness. Breasts:         Right: No inverted nipple, mass, nipple discharge, skin change or tenderness. Left: No inverted nipple, mass, nipple discharge, skin change or tenderness. Scars from prior breast augmentation well healed and no abnormal retraction or dimpling has occurred. Abdominal:      General: Bowel sounds are normal. There is no distension. Palpations: Abdomen is soft. There is no mass. Tenderness: There is no abdominal tenderness. There is no guarding or rebound. Genitourinary:     Labia:         Right: No rash, tenderness, lesion or injury. Left: No rash, tenderness, lesion or injury. Vagina: Normal. No signs of injury and foreign body. No vaginal discharge, erythema, tenderness or bleeding. Cervix: No cervical motion tenderness, discharge or friability. Uterus: Not enlarged and not tender. Adnexa:         Right: No mass, tenderness or fullness. Left: No mass, tenderness or fullness. Comments: External genitalia are normal in appearance. Examination of urethral meatus reveals location normal and size normal.   Examination of urethra shows no abnormalities. Examination of vaginal vault reveals no abnormalities. Cervix is long and closed without visible pathology. Pap smear collected. Uterine portion of bimanual exam reveals contour normal, shape regular, descensus absent, no nodularity, no tenderness and size 6 weeks GA. Adnexa and parametria exam reveals no masses, nontender. Visual examination of anus and perineum shows no abnormalities. Musculoskeletal: Normal range of motion. General: No tenderness. Lymphadenopathy:      Cervical: No cervical adenopathy. Upper Body:      Right upper body: No supraclavicular adenopathy. Left upper body: No supraclavicular adenopathy. Lower Body: No right inguinal adenopathy. No left inguinal adenopathy. Skin:     General: Skin is warm and dry. Coloration: Skin is not pale. Findings: No erythema or rash. Neurological:      Mental Status: She is alert and oriented to person, place, and time. Cranial Nerves: No cranial nerve deficit. Motor: No abnormal muscle tone. Coordination: Coordination normal.      Deep Tendon Reflexes: Reflexes are normal and symmetric. Psychiatric:         Behavior: Behavior normal.         Thought Content: Thought content normal.         Judgment: Judgment normal.         1. Well woman exam with routine gynecological exam    2. Screening, anemia, deficiency, iron    3. Screening for hematuria or proteinuria    4. Encounter for screening for cervical cancer    5. Encounter for screening for human papillomavirus (HPV)    6. Encounter for screening mammogram for malignant neoplasm of breast      Orders Placed This Encounter   Procedures    [facility] BELLE DIGITAL SCREEN BILATERAL    PAP IG, Aptima HPV and rfx 16/18,45 ([id number])    AMB POC HEMOGLOBIN (HGB)    AMB POC URINALYSIS DIP STICK AUTO W/O MICRO     Orders Placed This Encounter   Medications    norethindrone-ethinyl estradiol (JUNEL FE 1/20) 1-20 MG-MCG per tablet     Sig: Take 1 tablet by mouth daily     Dispense:  3 packet     Refill:  4     Routine age-appropriate well woman counseling was performed. Mammo ordered. Pap done.   OCPs rx'd    Return in about 1 year (around 6/22/2023), or if symptoms worsen or fail to improve, for Annual exam.

## 2022-06-30 LAB
CYTOLOGIST CVX/VAG CYTO: NORMAL
CYTOLOGY CVX/VAG DOC THIN PREP: NORMAL
HPV APTIMA: NEGATIVE
Lab: NORMAL
PATH REPORT.FINAL DX SPEC: NORMAL
STAT OF ADQ CVX/VAG CYTO-IMP: NORMAL

## 2022-08-05 ENCOUNTER — HOSPITAL ENCOUNTER (OUTPATIENT)
Dept: MAMMOGRAPHY | Age: 42
Discharge: HOME | End: 2022-08-08
Payer: COMMERCIAL

## 2022-08-05 PROCEDURE — 77063 BREAST TOMOSYNTHESIS BI: CPT

## 2022-12-15 ENCOUNTER — APPOINTMENT (RX ONLY)
Dept: URBAN - METROPOLITAN AREA CLINIC 330 | Facility: CLINIC | Age: 42
Setting detail: DERMATOLOGY
End: 2022-12-15

## 2022-12-15 DIAGNOSIS — L81.1 CHLOASMA: ICD-10-CM | Status: INADEQUATELY CONTROLLED

## 2022-12-15 DIAGNOSIS — L70.0 ACNE VULGARIS: ICD-10-CM | Status: INADEQUATELY CONTROLLED

## 2022-12-15 DIAGNOSIS — L20.89 OTHER ATOPIC DERMATITIS: ICD-10-CM | Status: INADEQUATELY CONTROLLED

## 2022-12-15 DIAGNOSIS — L98.8 OTHER SPECIFIED DISORDERS OF THE SKIN AND SUBCUTANEOUS TISSUE: ICD-10-CM

## 2022-12-15 PROBLEM — L20.84 INTRINSIC (ALLERGIC) ECZEMA: Status: ACTIVE | Noted: 2022-12-15

## 2022-12-15 PROCEDURE — 99204 OFFICE O/P NEW MOD 45 MIN: CPT

## 2022-12-15 PROCEDURE — ? COUNSELING

## 2022-12-15 PROCEDURE — ? OTHER

## 2022-12-15 PROCEDURE — ? MDM - TREATMENT GOALS

## 2022-12-15 PROCEDURE — ? PRESCRIPTION MEDICATION MANAGEMENT

## 2022-12-15 PROCEDURE — ? PRESCRIPTION

## 2022-12-15 RX ORDER — ADAPALENE 3 MG/G
GEL TOPICAL
Qty: 45 | Refills: 2 | Status: ERX | COMMUNITY
Start: 2022-12-15

## 2022-12-15 RX ORDER — HYDROQUINONE 40 MG/G
CREAM TOPICAL
Qty: 28.35 | Refills: 2 | Status: ERX | COMMUNITY
Start: 2022-12-15

## 2022-12-15 RX ORDER — RUXOLITINIB 15 MG/G
CREAM TOPICAL
Qty: 60 | Refills: 2 | Status: ERX | COMMUNITY
Start: 2022-12-15

## 2022-12-15 RX ORDER — HYDROCORTISONE 25 MG/G
CREAM TOPICAL
Qty: 30 | Refills: 2 | Status: ERX | COMMUNITY
Start: 2022-12-15

## 2022-12-15 RX ADMIN — RUXOLITINIB: 15 CREAM TOPICAL at 00:00

## 2022-12-15 RX ADMIN — HYDROQUINONE: 40 CREAM TOPICAL at 00:00

## 2022-12-15 RX ADMIN — HYDROCORTISONE: 25 CREAM TOPICAL at 00:00

## 2022-12-15 RX ADMIN — ADAPALENE: 3 GEL TOPICAL at 00:00

## 2022-12-15 ASSESSMENT — LOCATION ZONE DERM
LOCATION ZONE: FACE
LOCATION ZONE: NOSE
LOCATION ZONE: ARM
LOCATION ZONE: NECK

## 2022-12-15 ASSESSMENT — LOCATION DETAILED DESCRIPTION DERM
LOCATION DETAILED: LEFT INFERIOR LATERAL NECK
LOCATION DETAILED: LEFT VENTRAL PROXIMAL FOREARM
LOCATION DETAILED: RIGHT SUPERIOR CENTRAL MALAR CHEEK
LOCATION DETAILED: LEFT SUPERIOR CENTRAL MALAR CHEEK
LOCATION DETAILED: NASAL DORSUM
LOCATION DETAILED: RIGHT ANTECUBITAL SKIN
LOCATION DETAILED: RIGHT INFERIOR CENTRAL MALAR CHEEK
LOCATION DETAILED: LEFT INFERIOR LATERAL MALAR CHEEK
LOCATION DETAILED: INFERIOR MID FOREHEAD
LOCATION DETAILED: RIGHT INFERIOR LATERAL NECK

## 2022-12-15 ASSESSMENT — LOCATION SIMPLE DESCRIPTION DERM
LOCATION SIMPLE: RIGHT ANTERIOR NECK
LOCATION SIMPLE: LEFT ANTERIOR NECK
LOCATION SIMPLE: LEFT FOREARM
LOCATION SIMPLE: NOSE
LOCATION SIMPLE: RIGHT UPPER ARM
LOCATION SIMPLE: LEFT CHEEK
LOCATION SIMPLE: INFERIOR FOREHEAD
LOCATION SIMPLE: RIGHT CHEEK

## 2022-12-15 ASSESSMENT — SEVERITY ASSESSMENT 2020: SEVERITY 2020: MILD

## 2022-12-15 ASSESSMENT — SEVERITY ASSESSMENT: SEVERITY: MILD, SLIGHTLY DARKER THAN THE SURROUNDING NORMAL SKIN

## 2022-12-15 ASSESSMENT — SEVERITY ASSESSMENT OVERALL AMONG ALL PATIENTS
IN YOUR EXPERIENCE, AMONG ALL PATIENTS YOU HAVE SEEN WITH THIS CONDITION, HOW SEVERE IS THIS PATIENT'S CONDITION?: FEW INFLAMMATORY LESIONS, SOME NONINFLAMMATORY

## 2022-12-15 NOTE — PROCEDURE: PRESCRIPTION MEDICATION MANAGEMENT
Detail Level: Zone
Plan: Patient is currently using 2.5% hydrocortisone. \\nDiscussed Opzelura, patient would like to try this.
Render In Strict Bullet Format?: No
Plan: Discussed Obagi clear, advised patient this can be purchased in office. Advised patient to use bid.
Plan: Discussed niacinamide

## 2022-12-15 NOTE — PROCEDURE: COUNSELING
Detail Level: Simple
Tetracycline Counseling: Patient counseled regarding possible photosensitivity and increased risk for sunburn.  Patient instructed to avoid sunlight, if possible.  When exposed to sunlight, patients should wear protective clothing, sunglasses, and sunscreen.  The patient was instructed to call the office immediately if the following severe adverse effects occur:  hearing changes, easy bruising/bleeding, severe headache, or vision changes.  The patient verbalized understanding of the proper use and possible adverse effects of tetracycline.  All of the patient's questions and concerns were addressed. Patient understands to avoid pregnancy while on therapy due to potential birth defects.
Topical Retinoid Pregnancy And Lactation Text: This medication is Pregnancy Category C. It is unknown if this medication is excreted in breast milk.
Doxycycline Pregnancy And Lactation Text: This medication is Pregnancy Category D and not consider safe during pregnancy. It is also excreted in breast milk but is considered safe for shorter treatment courses.
Azithromycin Counseling:  I discussed with the patient the risks of azithromycin including but not limited to GI upset, allergic reaction, drug rash, diarrhea, and yeast infections.
Spironolactone Counseling: Patient advised regarding risks of diarrhea, abdominal pain, hyperkalemia, birth defects (for female patients), liver toxicity and renal toxicity. The patient may need blood work to monitor liver and kidney function and potassium levels while on therapy. The patient verbalized understanding of the proper use and possible adverse effects of spironolactone.  All of the patient's questions and concerns were addressed.
Benzoyl Peroxide Pregnancy And Lactation Text: This medication is Pregnancy Category C. It is unknown if benzoyl peroxide is excreted in breast milk.
Dapsone Pregnancy And Lactation Text: This medication is Pregnancy Category C and is not considered safe during pregnancy or breast feeding.
Topical Clindamycin Pregnancy And Lactation Text: This medication is Pregnancy Category B and is considered safe during pregnancy. It is unknown if it is excreted in breast milk.
Isotretinoin Pregnancy And Lactation Text: This medication is Pregnancy Category X and is considered extremely dangerous during pregnancy. It is unknown if it is excreted in breast milk.
Aklief counseling:  Patient advised to apply a pea-sized amount only at bedtime and wait 30 minutes after washing their face before applying.  If too drying, patient may add a non-comedogenic moisturizer.  The most commonly reported side effects including irritation, redness, scaling, dryness, stinging, burning, itching, and increased risk of sunburn.  The patient verbalized understanding of the proper use and possible adverse effects of retinoids.  All of the patient's questions and concerns were addressed.
Tazorac Pregnancy And Lactation Text: This medication is not safe during pregnancy. It is unknown if this medication is excreted in breast milk.
Erythromycin Pregnancy And Lactation Text: This medication is Pregnancy Category B and is considered safe during pregnancy. It is also excreted in breast milk.
Azelaic Acid Pregnancy And Lactation Text: This medication is considered safe during pregnancy and breast feeding.
Birth Control Pills Pregnancy And Lactation Text: This medication should be avoided if pregnant and for the first 30 days post-partum.
Topical Sulfur Applications Counseling: Topical Sulfur Counseling: Patient counseled that this medication may cause skin irritation or allergic reactions.  In the event of skin irritation, the patient was advised to reduce the amount of the drug applied or use it less frequently.   The patient verbalized understanding of the proper use and possible adverse effects of topical sulfur application.  All of the patient's questions and concerns were addressed.
Minocycline Counseling: Patient advised regarding possible photosensitivity and discoloration of the teeth, skin, lips, tongue and gums.  Patient instructed to avoid sunlight, if possible.  When exposed to sunlight, patients should wear protective clothing, sunglasses, and sunscreen.  The patient was instructed to call the office immediately if the following severe adverse effects occur:  hearing changes, easy bruising/bleeding, severe headache, or vision changes.  The patient verbalized understanding of the proper use and possible adverse effects of minocycline.  All of the patient's questions and concerns were addressed.
Aklief Pregnancy And Lactation Text: It is unknown if this medication is safe to use during pregnancy.  It is unknown if this medication is excreted in breast milk.  Breastfeeding women should use the topical cream on the smallest area of the skin for the shortest time needed while breastfeeding.  Do not apply to nipple and areola.
Azithromycin Pregnancy And Lactation Text: This medication is considered safe during pregnancy and is also secreted in breast milk.
Sarecycline Counseling: Patient advised regarding possible photosensitivity and discoloration of the teeth, skin, lips, tongue and gums.  Patient instructed to avoid sunlight, if possible.  When exposed to sunlight, patients should wear protective clothing, sunglasses, and sunscreen.  The patient was instructed to call the office immediately if the following severe adverse effects occur:  hearing changes, easy bruising/bleeding, severe headache, or vision changes.  The patient verbalized understanding of the proper use and possible adverse effects of sarecycline.  All of the patient's questions and concerns were addressed.
Winlevi Counseling:  I discussed with the patient the risks of topical clascoterone including but not limited to erythema, scaling, itching, and stinging. Patient voiced their understanding.
Bactrim Pregnancy And Lactation Text: This medication is Pregnancy Category D and is known to cause fetal risk.  It is also excreted in breast milk.
Erythromycin Counseling:  I discussed with the patient the risks of erythromycin including but not limited to GI upset, allergic reaction, drug rash, diarrhea, increase in liver enzymes, and yeast infections.
Use Enhanced Medication Counseling?: No
Spironolactone Pregnancy And Lactation Text: This medication can cause feminization of the male fetus and should be avoided during pregnancy. The active metabolite is also found in breast milk.
Topical Retinoid counseling:  Patient advised to apply a pea-sized amount only at bedtime and wait 30 minutes after washing their face before applying.  If too drying, patient may add a non-comedogenic moisturizer. The patient verbalized understanding of the proper use and possible adverse effects of retinoids.  All of the patient's questions and concerns were addressed.
Topical Clindamycin Counseling: Patient counseled that this medication may cause skin irritation or allergic reactions.  In the event of skin irritation, the patient was advised to reduce the amount of the drug applied or use it less frequently.   The patient verbalized understanding of the proper use and possible adverse effects of clindamycin.  All of the patient's questions and concerns were addressed.
High Dose Vitamin A Counseling: Side effects reviewed, pt to contact office should one occur.
Tetracycline Pregnancy And Lactation Text: This medication is Pregnancy Category D and not consider safe during pregnancy. It is also excreted in breast milk.
Tazorac Counseling:  Patient advised that medication is irritating and drying.  Patient may need to apply sparingly and wash off after an hour before eventually leaving it on overnight.  The patient verbalized understanding of the proper use and possible adverse effects of tazorac.  All of the patient's questions and concerns were addressed.
Isotretinoin Counseling: Patient should get monthly blood tests, not donate blood, not drive at night if vision affected, not share medication, and not undergo elective surgery for 6 months after tx completed. Side effects reviewed, pt to contact office should one occur.
Azelaic Acid Counseling: Patient counseled that medicine may cause skin irritation and to avoid applying near the eyes.  In the event of skin irritation, the patient was advised to reduce the amount of the drug applied or use it less frequently.   The patient verbalized understanding of the proper use and possible adverse effects of azelaic acid.  All of the patient's questions and concerns were addressed.
Dapsone Counseling: I discussed with the patient the risks of dapsone including but not limited to hemolytic anemia, agranulocytosis, rashes, methemoglobinemia, kidney failure, peripheral neuropathy, headaches, GI upset, and liver toxicity.  Patients who start dapsone require monitoring including baseline LFTs and weekly CBCs for the first month, then every month thereafter.  The patient verbalized understanding of the proper use and possible adverse effects of dapsone.  All of the patient's questions and concerns were addressed.
High Dose Vitamin A Pregnancy And Lactation Text: High dose vitamin A therapy is contraindicated during pregnancy and breast feeding.
Doxycycline Counseling:  Patient counseled regarding possible photosensitivity and increased risk for sunburn.  Patient instructed to avoid sunlight, if possible.  When exposed to sunlight, patients should wear protective clothing, sunglasses, and sunscreen.  The patient was instructed to call the office immediately if the following severe adverse effects occur:  hearing changes, easy bruising/bleeding, severe headache, or vision changes.  The patient verbalized understanding of the proper use and possible adverse effects of doxycycline.  All of the patient's questions and concerns were addressed.
Bactrim Counseling:  I discussed with the patient the risks of sulfa antibiotics including but not limited to GI upset, allergic reaction, drug rash, diarrhea, dizziness, photosensitivity, and yeast infections.  Rarely, more serious reactions can occur including but not limited to aplastic anemia, agranulocytosis, methemoglobinemia, blood dyscrasias, liver or kidney failure, lung infiltrates or desquamative/blistering drug rashes.
Winlevi Pregnancy And Lactation Text: This medication is considered safe during pregnancy and breastfeeding.
Benzoyl Peroxide Counseling: Patient counseled that medicine may cause skin irritation and bleach clothing.  In the event of skin irritation, the patient was advised to reduce the amount of the drug applied or use it less frequently.   The patient verbalized understanding of the proper use and possible adverse effects of benzoyl peroxide.  All of the patient's questions and concerns were addressed.
Topical Sulfur Applications Pregnancy And Lactation Text: This medication is Pregnancy Category C and has an unknown safety profile during pregnancy. It is unknown if this topical medication is excreted in breast milk.
Birth Control Pills Counseling: Birth Control Pill Counseling: I discussed with the patient the potential side effects of OCPs including but not limited to increased risk of stroke, heart attack, thrombophlebitis, deep venous thrombosis, hepatic adenomas, breast changes, GI upset, headaches, and depression.  The patient verbalized understanding of the proper use and possible adverse effects of OCPs. All of the patient's questions and concerns were addressed.

## 2023-03-30 ENCOUNTER — OFFICE VISIT (OUTPATIENT)
Dept: NEUROLOGY | Age: 43
End: 2023-03-30
Payer: COMMERCIAL

## 2023-03-30 VITALS
SYSTOLIC BLOOD PRESSURE: 113 MMHG | HEIGHT: 64 IN | BODY MASS INDEX: 20.28 KG/M2 | HEART RATE: 71 BPM | WEIGHT: 118.8 LBS | OXYGEN SATURATION: 99 % | DIASTOLIC BLOOD PRESSURE: 75 MMHG

## 2023-03-30 DIAGNOSIS — G43.101 MIGRAINE WITH AURA AND WITH STATUS MIGRAINOSUS, NOT INTRACTABLE: Primary | ICD-10-CM

## 2023-03-30 DIAGNOSIS — Z72.821 POOR SLEEP HYGIENE: ICD-10-CM

## 2023-03-30 PROCEDURE — 99215 OFFICE O/P EST HI 40 MIN: CPT | Performed by: NURSE PRACTITIONER

## 2023-03-30 ASSESSMENT — ENCOUNTER SYMPTOMS
VOMITING: 1
PHOTOPHOBIA: 1
RESPIRATORY NEGATIVE: 1
NAUSEA: 1

## 2023-03-30 NOTE — PATIENT INSTRUCTIONS
Headache Education:   Instructed the patient on over-the-counter headache management medications including: CoQ10, magnesium oxide, and butterbur. To avoid a pain medication overuse headache trying not to take pain medicines more than 3 doses a week. To help relieve headache symptoms without taking pain medicine lie down under darkroom and drink glass of water. Consider lifestyle modification including good sleep hygiene, routine medial schedules, regular exercise and managing triggers. Keep a headache diary  to reveal triggers and possible patterns. Triggers may be: Food, stress, perfumes, alcohol, or even chocolate. Drink plenty of water and try to get 8 hours of sleep each night to reduce risk factors that may cause headaches. Samples of Qulipta 30 mg (1/2 tablet) daily for 7 days, then increase to 60 mg daily if no side effects and tolerating medication. Samples of Nurtec ODT 75 mg and Ubrelvy 100 mg provided to patient. Advised not to take medication on same day, and to update office on efficacy. Instructions:  Nurtec ODT 75 mg daily as needed for migraine abortive therapy. Not to exceed 75mg/24 hours. Ubrelvy 100 mg daily as needed for migraine abortive therapy. May repeat for 1 dose in 2 hours if needed. Not to exceed 200mg/24 hours.

## 2023-04-12 ENCOUNTER — TELEPHONE (OUTPATIENT)
Dept: NEUROLOGY | Age: 43
End: 2023-04-12

## 2023-04-19 DIAGNOSIS — G43.101 MIGRAINE WITH AURA AND WITH STATUS MIGRAINOSUS, NOT INTRACTABLE: Primary | ICD-10-CM

## 2023-04-28 ENCOUNTER — TELEPHONE (OUTPATIENT)
Dept: NEUROLOGY | Age: 43
End: 2023-04-28

## 2023-04-28 NOTE — TELEPHONE ENCOUNTER
Called patient to schedule appt for botox with Alysa Taveras per message from [facility]. No answer,LVM.

## 2023-05-01 ENCOUNTER — TELEPHONE (OUTPATIENT)
Dept: NEUROLOGY | Age: 43
End: 2023-05-01

## 2023-05-01 NOTE — TELEPHONE ENCOUNTER
----- Message from [employer]. Mony Hobson sent at 4/28/2023  2:42 PM EDT -----  Regarding: Kasie Poon: [phone]  Good Afternoon. I am having a hard time getting an appointment for Botox, the lady I spoke with today was less than helpful. She told me the first available appointment is July 13, and they have not even gotten it authorized. I have spoken with the pharmacy and they said that they were shipping it on May 3rd. Can you see if the authorization has been done, the meds will be shipped. Can I also get more of the nurtec and ubrelvy as I'm out.  I had a very severe migraine this morning and it is still lingering today

## 2023-05-01 NOTE — TELEPHONE ENCOUNTER
----- Message from [employer] sent at 4/28/2023  2:42 PM EDT -----  Regarding: Romequintin Cayetano: [phone]  Good Afternoon. I am having a hard time getting an appointment for Botox, the lady I spoke with today was less than helpful. She told me the first available appointment is July 13, and they have not even gotten it authorized. I have spoken with the pharmacy and they said that they were shipping it on May 3rd. Can you see if the authorization has been done, the meds will be shipped. Can I also get more of the nurtec and ubrelvy as I'm out.  I had a very severe migraine this morning and it is still lingering today

## 2023-05-01 NOTE — TELEPHONE ENCOUNTER
PT called back and asked patient if she was take Nurtec and Ubrelvy at same time . Patient stated she takes the Nurtec if she wakes up and already has one then the ulbrelvy if she fells it come on . She says Nasim Winston makes her vomit. Patient was told she can not take both in same day. We will go over when she come in.    Pt coming to  Nurtec

## 2023-05-01 NOTE — TELEPHONE ENCOUNTER
----- Message from [employer]ld[employer]. Kacy Nicole sent at 4/28/2023  2:42 PM EDT -----  Regarding: Adwoa Gonsalez: [phone]  Good Afternoon. I am having a hard time getting an appointment for Botox, the lady I spoke with today was less than helpful. She told me the first available appointment is July 13, and they have not even gotten it authorized. I have spoken with the pharmacy and they said that they were shipping it on May 3rd. Can you see if the authorization has been done, the meds will be shipped. Can I also get more of the nurtec and ubrelvy as I'm out.  I had a very severe migraine this morning and it is still lingering today

## 2023-05-15 ENCOUNTER — OFFICE VISIT (OUTPATIENT)
Dept: NEUROLOGY | Age: 43
End: 2023-05-15
Payer: COMMERCIAL

## 2023-05-15 VITALS — SYSTOLIC BLOOD PRESSURE: 120 MMHG | HEART RATE: 78 BPM | DIASTOLIC BLOOD PRESSURE: 62 MMHG

## 2023-05-15 DIAGNOSIS — G43.101 MIGRAINE WITH AURA AND WITH STATUS MIGRAINOSUS, NOT INTRACTABLE: Primary | ICD-10-CM

## 2023-05-15 PROCEDURE — 64615 CHEMODENERV MUSC MIGRAINE: CPT

## 2023-05-15 NOTE — PROGRESS NOTES
[address], [address] [facility]  Phone: [phone] Fax [phone]  ADRIA Noriega          Patient: Jose Alejandro Benavides. [facility]  Provider: ADRIA Noriega       Procedure note: Botulinum Toxin injections     Indication: Chronic Migraine        Subjective:      Patient presents for chemodenervation for chronic migraine. This will be her first injection. She was referred by Vidal Cyr NP. She will continue to follow for medication management. The patient has previously failed numerous medication trials for migraine prevention as well as abortive treatments. Patient reports no less than 15 headache days per month and no more than 5 headache free days per month. She is currently taking either Nurtec or Ubrelvy or OTC options for breakthrough headaches. She reports not tolerating Charlott Ohms. According to a review of the patient's record, she has suffered from migraines for many years. She has previously described her headaches as throbbing. Some reported symptoms associated with her headaches include transient sensory changes, photophobia, phonophobia, nausea and vomiting. History of visual aura. Past medical history, surgical history, social history, family history, medications and allergies were all reviewed and updated as appropriate. Outpatient Encounter Medications as of 5/15/2023   Medication Sig Dispense Refill    Onabotulinumtoxin A (BOTOX) 200 units injection Inject 200 Units into the muscle every 3 months 1 each 3    Levocetirizine Dihydrochloride (XYZAL PO) Take 1 tablet by mouth daily      norethindrone-ethinyl estradiol (JUNEL FE 1/20) 1-20 MG-MCG per tablet Take 1 tablet by mouth daily 3 packet 4     No facility-administered encounter medications on file as of 5/15/2023. Exam:      Visit Vitals  There were no vitals filed for this visit.        General Exam:  General - Well developed, well nourished, in no apparent

## 2023-05-25 ENCOUNTER — OFFICE VISIT (OUTPATIENT)
Dept: NEUROLOGY | Age: 43
End: 2023-05-25
Payer: COMMERCIAL

## 2023-05-25 VITALS
SYSTOLIC BLOOD PRESSURE: 108 MMHG | BODY MASS INDEX: 19.36 KG/M2 | HEART RATE: 80 BPM | HEIGHT: 64 IN | WEIGHT: 113.4 LBS | OXYGEN SATURATION: 98 % | DIASTOLIC BLOOD PRESSURE: 67 MMHG

## 2023-05-25 DIAGNOSIS — G43.101 MIGRAINE WITH AURA AND WITH STATUS MIGRAINOSUS, NOT INTRACTABLE: Primary | ICD-10-CM

## 2023-05-25 DIAGNOSIS — G47.00 INSOMNIA, UNSPECIFIED TYPE: ICD-10-CM

## 2023-05-25 PROCEDURE — 99213 OFFICE O/P EST LOW 20 MIN: CPT | Performed by: NURSE PRACTITIONER

## 2023-05-25 RX ORDER — UBROGEPANT 100 MG/1
100 TABLET ORAL DAILY PRN
Qty: 48 TABLET | Refills: 3 | Status: SHIPPED | OUTPATIENT
Start: 2023-05-25

## 2023-05-25 ASSESSMENT — PATIENT HEALTH QUESTIONNAIRE - PHQ9
SUM OF ALL RESPONSES TO PHQ QUESTIONS 1-9: 0
1. LITTLE INTEREST OR PLEASURE IN DOING THINGS: 0
2. FEELING DOWN, DEPRESSED OR HOPELESS: 0
SUM OF ALL RESPONSES TO PHQ9 QUESTIONS 1 & 2: 0
SUM OF ALL RESPONSES TO PHQ QUESTIONS 1-9: 0

## 2023-05-25 ASSESSMENT — ENCOUNTER SYMPTOMS
PHOTOPHOBIA: 1
RESPIRATORY NEGATIVE: 1
NAUSEA: 1

## 2023-05-25 NOTE — PROGRESS NOTES
diary  to reveal triggers and possible patterns. Triggers may be: Food, stress, perfumes, alcohol, or even chocolate. Drink plenty of water and try to get 8 hours of sleep each night to reduce risk factors that may cause headaches. -     Ubrogepant (UBRELVY) 100 MG TABS; Take 100 mg by mouth daily as needed (may repeat for 1 dose in 2 hours if needed. Not to exceed 200 mg/24 hours.)    Insomnia, unspecified type  This is contributing to recurrent headaches. Discussed sleep hygiene. Recommend decreasing caffeine intake and avoiding caffeine after 3 pm.   -     [address] [facility]      Follow up with [address] [place] [address]    I spent  50% of the 25total minutes of today's visit in coordination of care and patient/family education and counseling regarding the above patient concerns, reviewing the patient's medical record, my assessment and recommendations.             [facility], [address] [place] [address] Neurology [facility]  [address] [facility], [address] [place] [address], [facility]  WDXZP:[phone]

## 2023-07-09 SDOH — ECONOMIC STABILITY: TRANSPORTATION INSECURITY
IN THE PAST 12 MONTHS, HAS LACK OF TRANSPORTATION KEPT YOU FROM MEETINGS, WORK, OR FROM GETTING THINGS NEEDED FOR DAILY LIVING?: NO

## 2023-07-09 SDOH — ECONOMIC STABILITY: HOUSING INSECURITY
IN THE LAST 12 MONTHS, WAS THERE A TIME WHEN YOU DID NOT HAVE A STEADY PLACE TO SLEEP OR SLEPT IN A SHELTER (INCLUDING NOW)?: NO

## 2023-07-09 SDOH — ECONOMIC STABILITY: INCOME INSECURITY: HOW HARD IS IT FOR YOU TO PAY FOR THE VERY BASICS LIKE FOOD, HOUSING, MEDICAL CARE, AND HEATING?: NOT VERY HARD

## 2023-07-09 SDOH — ECONOMIC STABILITY: FOOD INSECURITY: WITHIN THE PAST 12 MONTHS, YOU WORRIED THAT YOUR FOOD WOULD RUN OUT BEFORE YOU GOT MONEY TO BUY MORE.: NEVER TRUE

## 2023-07-09 SDOH — ECONOMIC STABILITY: FOOD INSECURITY: WITHIN THE PAST 12 MONTHS, THE FOOD YOU BOUGHT JUST DIDN'T LAST AND YOU DIDN'T HAVE MONEY TO GET MORE.: NEVER TRUE

## 2023-07-12 ENCOUNTER — OFFICE VISIT (OUTPATIENT)
Dept: OBGYN CLINIC | Age: 43
End: 2023-07-12

## 2023-07-12 VITALS
HEIGHT: 64 IN | DIASTOLIC BLOOD PRESSURE: 70 MMHG | BODY MASS INDEX: 19.12 KG/M2 | WEIGHT: 112 LBS | SYSTOLIC BLOOD PRESSURE: 110 MMHG

## 2023-07-12 DIAGNOSIS — Z12.31 SCREENING MAMMOGRAM FOR BREAST CANCER: ICD-10-CM

## 2023-07-12 DIAGNOSIS — Z01.419 WELL WOMAN EXAM WITH ROUTINE GYNECOLOGICAL EXAM: Primary | ICD-10-CM

## 2023-07-12 RX ORDER — NORETHINDRONE ACETATE AND ETHINYL ESTRADIOL 1MG-20(21)
1 KIT ORAL DAILY
Qty: 3 PACKET | Refills: 4 | Status: SHIPPED | OUTPATIENT
Start: 2023-07-12

## 2023-07-17 RX ORDER — NORETHINDRONE ACETATE AND ETHINYL ESTRADIOL AND FERROUS FUMARATE 1MG-20(21)
KIT ORAL
Qty: 84 TABLET | Refills: 4 | OUTPATIENT
Start: 2023-07-17

## 2023-08-07 ENCOUNTER — OFFICE VISIT (OUTPATIENT)
Dept: NEUROLOGY | Age: 43
End: 2023-08-07
Payer: COMMERCIAL

## 2023-08-07 VITALS — DIASTOLIC BLOOD PRESSURE: 66 MMHG | SYSTOLIC BLOOD PRESSURE: 102 MMHG | HEART RATE: 86 BPM

## 2023-08-07 DIAGNOSIS — G43.101 MIGRAINE WITH AURA AND WITH STATUS MIGRAINOSUS, NOT INTRACTABLE: Primary | ICD-10-CM

## 2023-08-07 PROCEDURE — 64615 CHEMODENERV MUSC MIGRAINE: CPT

## 2023-08-07 NOTE — PROGRESS NOTES
[facility], [address], [address]  Phone: [phone] Fax [phone]  ADRIA Jennings          Patient: Loco Kinney. Chantal Cooper  Provider: ADRIA Jennings       Procedure note: Botulinum Toxin injections     Indication: Chronic Migraine        Subjective:      Patient presents for chemodenervation for chronic migraine. This will be her second injection. She was referred by Melia Parks NP. Moving forward she will follow Dr. Wiley Caba for medication management. The patient has previously failed numerous medication trials for migraine prevention as well as abortive treatments. She reports that since her last treatment, she has noticed a significant reduction in severity and frequency of headaches. Only reports 5 headache days since her last treatment. She is currently taking Ubrelvy or OTC options for breakthrough headaches. She reports not tolerating Michaelene Close. According to a review of the patient's record, she has suffered from migraines for many years. She has previously described her headaches as throbbing. Some reported symptoms associated with her headaches include transient sensory changes, photophobia, phonophobia, nausea and vomiting. History of visual aura. Past medical history, surgical history, social history, family history, medications and allergies were all reviewed and updated as appropriate. Outpatient Encounter Medications as of 8/7/2023   Medication Sig Dispense Refill    norethindrone-ethinyl estradiol (JUNEL FE 1/20) 1-20 MG-MCG per tablet Take 1 tablet by mouth daily 3 packet 4    Ubrogepant (UBRELVY) 100 MG TABS Take 100 mg by mouth daily as needed (may repeat for 1 dose in 2 hours if needed. Not to exceed 200 mg/24 hours. ) 10 tablet 11    Onabotulinumtoxin A (BOTOX) 200 units injection Inject 200 Units into the muscle every 3 months 1 each 3    Levocetirizine Dihydrochloride (XYZAL PO) Take 1 tablet by mouth daily

## 2023-08-11 ENCOUNTER — HOSPITAL ENCOUNTER (OUTPATIENT)
Dept: MAMMOGRAPHY | Age: 43
Discharge: HOME | End: 2023-08-11
Payer: COMMERCIAL

## 2023-08-11 DIAGNOSIS — Z12.31 SCREENING MAMMOGRAM FOR BREAST CANCER: ICD-10-CM

## 2023-08-11 DIAGNOSIS — Z01.419 WELL WOMAN EXAM WITH ROUTINE GYNECOLOGICAL EXAM: ICD-10-CM

## 2023-08-11 PROCEDURE — 77063 BREAST TOMOSYNTHESIS BI: CPT

## 2023-09-29 NOTE — PROGRESS NOTES
[address] [place] [address]  [phone]    Patient Name:  Zoya Lopez  YOB: 1980    Telehealth encounter is a billable service, with coverage as determined by the insurance carrier. The patient was located at Other: work  The provider was located at [place], [facility] were provided through a video synchronous discussion virtually to substitute for in-person clinic visit. Zoya Lopez is a 44 y.o. female who was seen by synchronous (real-time) audio-video technology on 10/2/2023. Consent:  She and/or her healthcare decision maker is aware that this patient-initiated Telehealth encounter is a billable service, with coverage as determined by her insurance carrier. She is aware that she may receive a bill and has provided verbal consent to proceed: Yes      Office Visit 10/2/2023    CHIEF COMPLAINT:    Chief Complaint   Patient presents with    New Patient       HISTORY OF PRESENT ILLNESS:      The patient presents in outpatient consultation at the request of Aurea Deras NP for management of obstructive sleep apnea. Chronic medical problems includes migraines and allergies. Patient reports having insomnia since she was a child. She continues to struggle with sleep onset and sleep maintenance insomnia she will get into bed between 10-11 pm and fall to sleep around 11:30 pm or 12 am. She will then wake up at 2, 4 and again at 6. She gets out of bed at 7:30 am. She reports seldom snoring, no witnessed apneas, no nocturnal gasping or choking. She never feels rested in the mornings and has fatigue during the day. ESS is 0/24. No napping. She has tried melatonin once but states that she had \"horrible nightmares with people chasing her and couldn't wake up\". She never wants to try that again and wants to avoid medications if possible.       She works as a  for [employer] on [employer]

## 2023-10-02 ENCOUNTER — TELEMEDICINE (OUTPATIENT)
Dept: SLEEP MEDICINE | Age: 43
End: 2023-10-02
Payer: COMMERCIAL

## 2023-10-02 DIAGNOSIS — Z72.821 POOR SLEEP HYGIENE: ICD-10-CM

## 2023-10-02 DIAGNOSIS — R09.81 NASAL CONGESTION: ICD-10-CM

## 2023-10-02 DIAGNOSIS — G47.00 PERSISTENT DISORDER OF INITIATING OR MAINTAINING SLEEP: Primary | ICD-10-CM

## 2023-10-02 PROCEDURE — 99203 OFFICE O/P NEW LOW 30 MIN: CPT | Performed by: NURSE PRACTITIONER

## 2023-10-02 ASSESSMENT — SLEEP AND FATIGUE QUESTIONNAIRES
HOW LIKELY ARE YOU TO NOD OFF OR FALL ASLEEP WHILE SITTING QUIETLY AFTER LUNCH WITHOUT ALCOHOL: 0
ESS TOTAL SCORE: 0
HOW LIKELY ARE YOU TO NOD OFF OR FALL ASLEEP WHILE LYING DOWN TO REST IN THE AFTERNOON WHEN CIRCUMSTANCES PERMIT: 0
HOW LIKELY ARE YOU TO NOD OFF OR FALL ASLEEP WHILE SITTING INACTIVE IN A PUBLIC PLACE: 0
HOW LIKELY ARE YOU TO NOD OFF OR FALL ASLEEP WHILE SITTING AND TALKING TO SOMEONE: 0
HOW LIKELY ARE YOU TO NOD OFF OR FALL ASLEEP WHILE WATCHING TV: 0
HOW LIKELY ARE YOU TO NOD OFF OR FALL ASLEEP WHEN YOU ARE A PASSENGER IN A CAR FOR AN HOUR WITHOUT A BREAK: 0
HOW LIKELY ARE YOU TO NOD OFF OR FALL ASLEEP WHILE SITTING AND READING: 0
HOW LIKELY ARE YOU TO NOD OFF OR FALL ASLEEP IN A CAR, WHILE STOPPED FOR A FEW MINUTES IN TRAFFIC: 0

## 2023-10-03 ENCOUNTER — TELEPHONE (OUTPATIENT)
Dept: NEUROLOGY | Age: 43
End: 2023-10-03

## 2023-10-27 ENCOUNTER — HOSPITAL ENCOUNTER (OUTPATIENT)
Dept: SLEEP CENTER | Age: 43
Discharge: HOME | End: 2023-10-30

## 2023-11-13 ENCOUNTER — OFFICE VISIT (OUTPATIENT)
Dept: NEUROLOGY | Age: 43
End: 2023-11-13
Payer: COMMERCIAL

## 2023-11-13 VITALS — OXYGEN SATURATION: 99 % | HEART RATE: 84 BPM | SYSTOLIC BLOOD PRESSURE: 103 MMHG | DIASTOLIC BLOOD PRESSURE: 64 MMHG

## 2023-11-13 DIAGNOSIS — G43.101 MIGRAINE WITH AURA AND WITH STATUS MIGRAINOSUS, NOT INTRACTABLE: Primary | ICD-10-CM

## 2023-11-13 PROCEDURE — 64615 CHEMODENERV MUSC MIGRAINE: CPT

## 2023-11-13 NOTE — PROGRESS NOTES
[facility] [facility] [address], [address]  Phone: [phone] Fax [phone]  ADRIA Feliz          Patient: Vickie Bustillos. Migue Vivas  Provider: ADRIA Feliz       Procedure note: Botulinum Toxin injections     Indication: Chronic Migraine        Subjective:      Patient presents for chemodenervation for chronic migraine. This will be her second injection. She was referred by Roberto Palmer NP. Moving forward she will follow Dr. Manju Srivastava for medication management. The patient has previously failed numerous medications for both prevention and abortive therapy. Please see note by Meet Marcial NP for additional detail. Significant improvement noted in severity and frequency of headaches since starting treatments. She is currently taking Ubrelvy or OTC options for breakthrough headaches. She reports not tolerating Janice Mattock. According to a review of the patient's record, she has suffered from migraines for many years. She has previously described her headaches as throbbing. Some reported symptoms associated with her headaches include transient sensory changes, photophobia, phonophobia, nausea and vomiting. History of visual aura. Past medical history, surgical history, social history, family history, medications and allergies were all reviewed and updated as appropriate. Outpatient Encounter Medications as of 2023   Medication Sig Dispense Refill    norethindrone-ethinyl estradiol (JUNEL ) 1-20 MG-MCG per tablet Take 1 tablet by mouth daily 3 packet 4    Ubrogepant (UBRELVY) 100 MG TABS Take 100 mg by mouth daily as needed (may repeat for 1 dose in 2 hours if needed. Not to exceed 200 mg/24 hours. ) 10 tablet 11    Onabotulinumtoxin A (BOTOX) 200 units injection Inject 200 Units into the muscle every 3 months 1 each 3    Levocetirizine Dihydrochloride (XYZAL PO) Take 1 tablet by mouth daily      [] Onabotulinumtoxin A (BOTOX)

## 2023-12-12 ENCOUNTER — OFFICE VISIT (OUTPATIENT)
Dept: NEUROLOGY | Age: 43
End: 2023-12-12
Payer: COMMERCIAL

## 2023-12-12 VITALS
HEIGHT: 64 IN | HEART RATE: 85 BPM | BODY MASS INDEX: 19.33 KG/M2 | WEIGHT: 113.2 LBS | OXYGEN SATURATION: 98 % | SYSTOLIC BLOOD PRESSURE: 116 MMHG | DIASTOLIC BLOOD PRESSURE: 75 MMHG

## 2023-12-12 DIAGNOSIS — G43.101 MIGRAINE WITH AURA AND WITH STATUS MIGRAINOSUS, NOT INTRACTABLE: ICD-10-CM

## 2023-12-12 PROCEDURE — 99214 OFFICE O/P EST MOD 30 MIN: CPT | Performed by: PSYCHIATRY & NEUROLOGY

## 2023-12-12 RX ORDER — UBROGEPANT 100 MG/1
100 TABLET ORAL DAILY PRN
Qty: 10 TABLET | Refills: 11 | Status: SHIPPED | OUTPATIENT
Start: 2023-12-12

## 2023-12-12 ASSESSMENT — ENCOUNTER SYMPTOMS
GASTROINTESTINAL NEGATIVE: 1
ALLERGIC/IMMUNOLOGIC NEGATIVE: 1
EYES NEGATIVE: 1
RESPIRATORY NEGATIVE: 1

## 2023-12-12 NOTE — PROGRESS NOTES
[facility], [address] [facility] [address], [address]  Phone: [phone] Fax [phone]  Dr. Babita Black      12/12/2023  Nichole Mitchell     Patient is referred by the following provider for consultation regarding as below:       I reviewed the available records and notes and have examined patient with the following findings:     Chief Complaint:  Chief Complaint   Patient presents with    New Patient    Migraine          HPI: This is a right handed 44 y.o.  female who is very pleasant very appropriate patient who unfortunately does suffer from migraine headaches since she was 16years of age. Her standard was about 20 migraines per month but they can last several days. Currently she is only having about 8/month because of Botox without Botox she would go right back up to 20/month. Ubrelvy 100 mg helps break the headache often she has to take it the next morning she has tried Nurtec but does not recall whether it is better or not. She has had MRIs of her brain done in 2019 that were normal.  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 She has a mother with migraine headaches and her daughter is now displaying migraine headaches. She has been seen by Dr. Elizabeth Ibrahim but Dr. Liz Austin is now doing her Botox. And doing great. In the past she has tried over-the-counter medicines codeine Imitrex all TRAM oxycodone Topamax Aimovig dye flow Kanak prednisone Imitrex Elavil Botox verapamil Qulipta Nurtec and Ubrelvy. IMAGING REVIEW:  I REVIEWED PERTINENT  IMAGES AND REPORTS WITH THE PATIENT PERSONALLY, DIRECTLY AND FULLY.      Past Medical History:  Past Medical History:   Diagnosis Date    Contact dermatitis and other eczema, due to unspecified cause     Headache     migraine    Nausea & vomiting     Ovarian cyst     UTI (urinary tract infection)        Past Surgical History:  Past Surgical History:   Procedure Laterality Date    BREAST SURGERY      IMPLANT BREAST

## 2023-12-28 ENCOUNTER — APPOINTMENT (RX ONLY)
Dept: URBAN - METROPOLITAN AREA CLINIC 330 | Facility: CLINIC | Age: 43
Setting detail: DERMATOLOGY
End: 2023-12-28

## 2023-12-28 DIAGNOSIS — Z71.89 OTHER SPECIFIED COUNSELING: ICD-10-CM

## 2023-12-28 DIAGNOSIS — L70.0 ACNE VULGARIS: ICD-10-CM | Status: STABLE

## 2023-12-28 DIAGNOSIS — L81.1 CHLOASMA: ICD-10-CM | Status: IMPROVED

## 2023-12-28 DIAGNOSIS — D22 MELANOCYTIC NEVI: ICD-10-CM

## 2023-12-28 DIAGNOSIS — L57.8 OTHER SKIN CHANGES DUE TO CHRONIC EXPOSURE TO NONIONIZING RADIATION: ICD-10-CM

## 2023-12-28 DIAGNOSIS — L20.89 OTHER ATOPIC DERMATITIS: ICD-10-CM

## 2023-12-28 PROBLEM — D22.5 MELANOCYTIC NEVI OF TRUNK: Status: ACTIVE | Noted: 2023-12-28

## 2023-12-28 PROCEDURE — ? TREATMENT REGIMEN

## 2023-12-28 PROCEDURE — ? COUNSELING

## 2023-12-28 PROCEDURE — ? EDUCATIONAL RESOURCES PROVIDED

## 2023-12-28 PROCEDURE — ? BODY PHOTOGRAPHY

## 2023-12-28 PROCEDURE — ? PRESCRIPTION MEDICATION MANAGEMENT

## 2023-12-28 PROCEDURE — ? OTHER

## 2023-12-28 PROCEDURE — 99214 OFFICE O/P EST MOD 30 MIN: CPT

## 2023-12-28 PROCEDURE — ? FULL BODY SKIN EXAM

## 2023-12-28 PROCEDURE — ? MDM - TREATMENT GOALS

## 2023-12-28 ASSESSMENT — LOCATION ZONE DERM
LOCATION ZONE: TRUNK
LOCATION ZONE: NECK
LOCATION ZONE: FACE
LOCATION ZONE: ARM
LOCATION ZONE: NOSE

## 2023-12-28 ASSESSMENT — LOCATION DETAILED DESCRIPTION DERM
LOCATION DETAILED: RIGHT SUPERIOR CENTRAL MALAR CHEEK
LOCATION DETAILED: LEFT SUPERIOR UPPER BACK
LOCATION DETAILED: INFERIOR MID FOREHEAD
LOCATION DETAILED: LEFT INFERIOR LATERAL MALAR CHEEK
LOCATION DETAILED: LEFT SUPERIOR CENTRAL MALAR CHEEK
LOCATION DETAILED: UPPER STERNUM
LOCATION DETAILED: LEFT VENTRAL PROXIMAL FOREARM
LOCATION DETAILED: RIGHT SUPERIOR UPPER BACK
LOCATION DETAILED: LEFT INFERIOR LATERAL NECK
LOCATION DETAILED: RIGHT ANTECUBITAL SKIN
LOCATION DETAILED: RIGHT INFERIOR LATERAL NECK
LOCATION DETAILED: NASAL DORSUM
LOCATION DETAILED: RIGHT INFERIOR CENTRAL MALAR CHEEK

## 2023-12-28 ASSESSMENT — LOCATION SIMPLE DESCRIPTION DERM
LOCATION SIMPLE: LEFT CHEEK
LOCATION SIMPLE: INFERIOR FOREHEAD
LOCATION SIMPLE: CHEST
LOCATION SIMPLE: LEFT ANTERIOR NECK
LOCATION SIMPLE: RIGHT CHEEK
LOCATION SIMPLE: LEFT FOREARM
LOCATION SIMPLE: RIGHT UPPER ARM
LOCATION SIMPLE: NOSE
LOCATION SIMPLE: RIGHT ANTERIOR NECK
LOCATION SIMPLE: LEFT UPPER BACK
LOCATION SIMPLE: RIGHT UPPER BACK

## 2023-12-28 ASSESSMENT — BSA RASH: BSA RASH: 0

## 2023-12-28 ASSESSMENT — SEVERITY ASSESSMENT OVERALL AMONG ALL PATIENTS
IN YOUR EXPERIENCE, AMONG ALL PATIENTS YOU HAVE SEEN WITH THIS CONDITION, HOW SEVERE IS THIS PATIENT'S CONDITION?: ALMOST CLEAR

## 2023-12-28 ASSESSMENT — SEVERITY ASSESSMENT 2020: SEVERITY 2020: CLEAR

## 2023-12-28 NOTE — PROCEDURE: BODY PHOTOGRAPHY
Number Of Photographs (Optional- Will Not Render If 0): 8
Consent was obtained for whole body photography. Patient understands that photograph costs may not be covered by insurance.
Detail Level: Detailed
Whole Body Statement: The whole body was photographed today.
Was The Entire Body Photographed (Cannot Bill Unless Entire Body Photographed)?: Please Select Yes or No - If you select Yes you are confirming that you took full body photographs
Reason For Photography: The patient is obtaining whole body photography to observe existing suspicious moles and or monitor for the appearance of any new lesions.

## 2023-12-28 NOTE — PROCEDURE: PRESCRIPTION MEDICATION MANAGEMENT
Initiate Treatment: Opzelura apply to affected areas bid for maintenance
Detail Level: Zone
Continue Regimen: Hydrocortisone 2.5% apply to affected areas bid x2wks prn flares
Plan: Pt did not receive Opzelura last year. She would like rx sent again
Render In Strict Bullet Format?: No
Continue Regimen: Pt using triluma prescribed by pcp
Continue Regimen: Adapalene 0.3% apply to face nightly
Detail Level: Simple
Plan: Pt decline’s refills today

## 2023-12-28 NOTE — PROCEDURE: COUNSELING
Detail Level: Generalized
Detail Level: Detailed
Detail Level: Simple
Tetracycline Counseling: Patient counseled regarding possible photosensitivity and increased risk for sunburn.  Patient instructed to avoid sunlight, if possible.  When exposed to sunlight, patients should wear protective clothing, sunglasses, and sunscreen.  The patient was instructed to call the office immediately if the following severe adverse effects occur:  hearing changes, easy bruising/bleeding, severe headache, or vision changes.  The patient verbalized understanding of the proper use and possible adverse effects of tetracycline.  All of the patient's questions and concerns were addressed. Patient understands to avoid pregnancy while on therapy due to potential birth defects.
Topical Retinoid Pregnancy And Lactation Text: This medication is Pregnancy Category C. It is unknown if this medication is excreted in breast milk.
Doxycycline Pregnancy And Lactation Text: This medication is Pregnancy Category D and not consider safe during pregnancy. It is also excreted in breast milk but is considered safe for shorter treatment courses.
Azithromycin Counseling:  I discussed with the patient the risks of azithromycin including but not limited to GI upset, allergic reaction, drug rash, diarrhea, and yeast infections.
Spironolactone Counseling: Patient advised regarding risks of diarrhea, abdominal pain, hyperkalemia, birth defects (for female patients), liver toxicity and renal toxicity. The patient may need blood work to monitor liver and kidney function and potassium levels while on therapy. The patient verbalized understanding of the proper use and possible adverse effects of spironolactone.  All of the patient's questions and concerns were addressed.
Benzoyl Peroxide Pregnancy And Lactation Text: This medication is Pregnancy Category C. It is unknown if benzoyl peroxide is excreted in breast milk.
Dapsone Pregnancy And Lactation Text: This medication is Pregnancy Category C and is not considered safe during pregnancy or breast feeding.
Topical Clindamycin Pregnancy And Lactation Text: This medication is Pregnancy Category B and is considered safe during pregnancy. It is unknown if it is excreted in breast milk.
Isotretinoin Pregnancy And Lactation Text: This medication is Pregnancy Category X and is considered extremely dangerous during pregnancy. It is unknown if it is excreted in breast milk.
Aklief counseling:  Patient advised to apply a pea-sized amount only at bedtime and wait 30 minutes after washing their face before applying.  If too drying, patient may add a non-comedogenic moisturizer.  The most commonly reported side effects including irritation, redness, scaling, dryness, stinging, burning, itching, and increased risk of sunburn.  The patient verbalized understanding of the proper use and possible adverse effects of retinoids.  All of the patient's questions and concerns were addressed.
Tazorac Pregnancy And Lactation Text: This medication is not safe during pregnancy. It is unknown if this medication is excreted in breast milk.
Erythromycin Pregnancy And Lactation Text: This medication is Pregnancy Category B and is considered safe during pregnancy. It is also excreted in breast milk.
Azelaic Acid Pregnancy And Lactation Text: This medication is considered safe during pregnancy and breast feeding.
Birth Control Pills Pregnancy And Lactation Text: This medication should be avoided if pregnant and for the first 30 days post-partum.
Topical Sulfur Applications Counseling: Topical Sulfur Counseling: Patient counseled that this medication may cause skin irritation or allergic reactions.  In the event of skin irritation, the patient was advised to reduce the amount of the drug applied or use it less frequently.   The patient verbalized understanding of the proper use and possible adverse effects of topical sulfur application.  All of the patient's questions and concerns were addressed.
Minocycline Counseling: Patient advised regarding possible photosensitivity and discoloration of the teeth, skin, lips, tongue and gums.  Patient instructed to avoid sunlight, if possible.  When exposed to sunlight, patients should wear protective clothing, sunglasses, and sunscreen.  The patient was instructed to call the office immediately if the following severe adverse effects occur:  hearing changes, easy bruising/bleeding, severe headache, or vision changes.  The patient verbalized understanding of the proper use and possible adverse effects of minocycline.  All of the patient's questions and concerns were addressed.
Aklief Pregnancy And Lactation Text: It is unknown if this medication is safe to use during pregnancy.  It is unknown if this medication is excreted in breast milk.  Breastfeeding women should use the topical cream on the smallest area of the skin for the shortest time needed while breastfeeding.  Do not apply to nipple and areola.
Azithromycin Pregnancy And Lactation Text: This medication is considered safe during pregnancy and is also secreted in breast milk.
Sarecycline Counseling: Patient advised regarding possible photosensitivity and discoloration of the teeth, skin, lips, tongue and gums.  Patient instructed to avoid sunlight, if possible.  When exposed to sunlight, patients should wear protective clothing, sunglasses, and sunscreen.  The patient was instructed to call the office immediately if the following severe adverse effects occur:  hearing changes, easy bruising/bleeding, severe headache, or vision changes.  The patient verbalized understanding of the proper use and possible adverse effects of sarecycline.  All of the patient's questions and concerns were addressed.
Winlevi Counseling:  I discussed with the patient the risks of topical clascoterone including but not limited to erythema, scaling, itching, and stinging. Patient voiced their understanding.
Bactrim Pregnancy And Lactation Text: This medication is Pregnancy Category D and is known to cause fetal risk.  It is also excreted in breast milk.
Erythromycin Counseling:  I discussed with the patient the risks of erythromycin including but not limited to GI upset, allergic reaction, drug rash, diarrhea, increase in liver enzymes, and yeast infections.
Use Enhanced Medication Counseling?: No
Spironolactone Pregnancy And Lactation Text: This medication can cause feminization of the male fetus and should be avoided during pregnancy. The active metabolite is also found in breast milk.
Topical Retinoid counseling:  Patient advised to apply a pea-sized amount only at bedtime and wait 30 minutes after washing their face before applying.  If too drying, patient may add a non-comedogenic moisturizer. The patient verbalized understanding of the proper use and possible adverse effects of retinoids.  All of the patient's questions and concerns were addressed.
Topical Clindamycin Counseling: Patient counseled that this medication may cause skin irritation or allergic reactions.  In the event of skin irritation, the patient was advised to reduce the amount of the drug applied or use it less frequently.   The patient verbalized understanding of the proper use and possible adverse effects of clindamycin.  All of the patient's questions and concerns were addressed.
High Dose Vitamin A Counseling: Side effects reviewed, pt to contact office should one occur.
Tetracycline Pregnancy And Lactation Text: This medication is Pregnancy Category D and not consider safe during pregnancy. It is also excreted in breast milk.
Tazorac Counseling:  Patient advised that medication is irritating and drying.  Patient may need to apply sparingly and wash off after an hour before eventually leaving it on overnight.  The patient verbalized understanding of the proper use and possible adverse effects of tazorac.  All of the patient's questions and concerns were addressed.
Isotretinoin Counseling: Patient should get monthly blood tests, not donate blood, not drive at night if vision affected, not share medication, and not undergo elective surgery for 6 months after tx completed. Side effects reviewed, pt to contact office should one occur.
Azelaic Acid Counseling: Patient counseled that medicine may cause skin irritation and to avoid applying near the eyes.  In the event of skin irritation, the patient was advised to reduce the amount of the drug applied or use it less frequently.   The patient verbalized understanding of the proper use and possible adverse effects of azelaic acid.  All of the patient's questions and concerns were addressed.
Dapsone Counseling: I discussed with the patient the risks of dapsone including but not limited to hemolytic anemia, agranulocytosis, rashes, methemoglobinemia, kidney failure, peripheral neuropathy, headaches, GI upset, and liver toxicity.  Patients who start dapsone require monitoring including baseline LFTs and weekly CBCs for the first month, then every month thereafter.  The patient verbalized understanding of the proper use and possible adverse effects of dapsone.  All of the patient's questions and concerns were addressed.
High Dose Vitamin A Pregnancy And Lactation Text: High dose vitamin A therapy is contraindicated during pregnancy and breast feeding.
Doxycycline Counseling:  Patient counseled regarding possible photosensitivity and increased risk for sunburn.  Patient instructed to avoid sunlight, if possible.  When exposed to sunlight, patients should wear protective clothing, sunglasses, and sunscreen.  The patient was instructed to call the office immediately if the following severe adverse effects occur:  hearing changes, easy bruising/bleeding, severe headache, or vision changes.  The patient verbalized understanding of the proper use and possible adverse effects of doxycycline.  All of the patient's questions and concerns were addressed.
Bactrim Counseling:  I discussed with the patient the risks of sulfa antibiotics including but not limited to GI upset, allergic reaction, drug rash, diarrhea, dizziness, photosensitivity, and yeast infections.  Rarely, more serious reactions can occur including but not limited to aplastic anemia, agranulocytosis, methemoglobinemia, blood dyscrasias, liver or kidney failure, lung infiltrates or desquamative/blistering drug rashes.
Winlevi Pregnancy And Lactation Text: This medication is considered safe during pregnancy and breastfeeding.
Benzoyl Peroxide Counseling: Patient counseled that medicine may cause skin irritation and bleach clothing.  In the event of skin irritation, the patient was advised to reduce the amount of the drug applied or use it less frequently.   The patient verbalized understanding of the proper use and possible adverse effects of benzoyl peroxide.  All of the patient's questions and concerns were addressed.
Topical Sulfur Applications Pregnancy And Lactation Text: This medication is Pregnancy Category C and has an unknown safety profile during pregnancy. It is unknown if this topical medication is excreted in breast milk.
Birth Control Pills Counseling: Birth Control Pill Counseling: I discussed with the patient the potential side effects of OCPs including but not limited to increased risk of stroke, heart attack, thrombophlebitis, deep venous thrombosis, hepatic adenomas, breast changes, GI upset, headaches, and depression.  The patient verbalized understanding of the proper use and possible adverse effects of OCPs. All of the patient's questions and concerns were addressed.

## 2024-01-24 ENCOUNTER — RX ONLY (OUTPATIENT)
Age: 44
Setting detail: RX ONLY
End: 2024-01-24

## 2024-01-24 RX ORDER — RUXOLITINIB 15 MG/G
CREAM TOPICAL
Qty: 60 | Refills: 2 | Status: ERX

## 2024-02-16 ENCOUNTER — OFFICE VISIT (OUTPATIENT)
Dept: NEUROLOGY | Age: 44
End: 2024-02-16

## 2024-02-16 VITALS — SYSTOLIC BLOOD PRESSURE: 128 MMHG | HEART RATE: 84 BPM | DIASTOLIC BLOOD PRESSURE: 86 MMHG

## 2024-02-16 DIAGNOSIS — G43.101 MIGRAINE WITH AURA AND WITH STATUS MIGRAINOSUS, NOT INTRACTABLE: Primary | ICD-10-CM

## 2024-02-16 NOTE — PROGRESS NOTES
[facility]Y  [address] [place] [address], [address]  [place] [address]  Phone: [phone] Fax [phone]  ADRIA Melton          Patient: Lakesha Otto  Provider: ADRIA Melton       Procedure note:  Botulinum Toxin injections     Indication: Chronic Migraine        Subjective:      Patient presents for chemodenervation for chronic migraine. This will be her third injection. She follows Dr. Bravo for medication management.    According to a review of the patient's record, she has suffered from migraines for many years. She has previously described her headaches as throbbing. Some reported symptoms associated with her headaches include transient sensory changes, photophobia, phonophobia, nausea and vomiting. History of visual aura. Clenching teeth with noted jaw pain likely contributing to headaches. Discussed treating masseters and patient in agreement.    Significant improvement noted in severity and frequency of headaches since starting treatments. Migraines have continued to improve since her last treatment; however, today she is overdue for her treatment due to insurance delays. Due to the delay, the benefit of the last treatment has worn off and her headaches have therefore increased.    She is currently taking Ubrelvy or OTC options for breakthrough headaches.     The patient has previously failed numerous medications for both prevention and abortive therapy.       Past medical history, surgical history, social history, family history, medications and allergies were all reviewed and updated as appropriate.      Outpatient Encounter Medications as of 2/16/2024   Medication Sig Dispense Refill    Ubrogepant (UBRELVY) 100 MG TABS Take 100 mg by mouth daily as needed (may repeat for 1 dose in 2 hours if needed. Not to exceed 200 mg/24 hours.) 10 tablet 11    norethindrone-ethinyl estradiol (JUNEL FE 1/20) 1-20 MG-MCG per tablet Take 1 tablet by mouth daily 3 packet 4

## 2024-02-16 NOTE — PROGRESS NOTES
[facility] NEUROLOGY  [address] [place] [address], [address]  [place] [address]  Phone: [phone] Fax [phone]  ADRIA Melton          Patient: Lakesha Otto  Provider: ADRIA Melton       Procedure note:  Botulinum Toxin injections     Indication: Chronic Migraine        Subjective:      Patient presents for chemodenervation for chronic migraine. This will be her third injection. She follows Dr. Bravo for medication management.    According to a review of the patient's record, she has suffered from migraines for many years. She has previously described her headaches as throbbing. Some reported symptoms associated with her headaches include transient sensory changes, photophobia, phonophobia, nausea and vomiting. History of visual aura. Clenching teeth with noted jaw pain likely contributing to headaches. Discussed treating masseters and patient in agreement.    Significant improvement noted in severity and frequency of headaches since starting treatments. Migraines have continued to improve since her last treatment. She is currently taking Ubrelvy or OTC options for breakthrough headaches.     Please refer to Dr. Bravo's office note for previous medication trials. Numerous- both preventive and abortive therapy options were unsuccessful in managing the patient's migraines.        Past medical history, surgical history, social history, family history, medications and allergies were all reviewed and updated as appropriate.      Outpatient Encounter Medications as of 2/16/2024   Medication Sig Dispense Refill    Ubrogepant (UBRELVY) 100 MG TABS Take 100 mg by mouth daily as needed (may repeat for 1 dose in 2 hours if needed. Not to exceed 200 mg/24 hours.) 10 tablet 11    norethindrone-ethinyl estradiol (JUNEL FE 1/20) 1-20 MG-MCG per tablet Take 1 tablet by mouth daily 3 packet 4    Onabotulinumtoxin A (BOTOX) 200 units injection Inject 200 Units into the muscle every 3 months 1 each 3

## 2024-04-09 ENCOUNTER — TELEPHONE (OUTPATIENT)
Dept: NEUROLOGY | Age: 44
End: 2024-04-09

## 2024-04-09 DIAGNOSIS — G43.101 MIGRAINE WITH AURA AND WITH STATUS MIGRAINOSUS, NOT INTRACTABLE: Primary | ICD-10-CM

## 2024-05-10 ENCOUNTER — OFFICE VISIT (OUTPATIENT)
Dept: NEUROLOGY | Age: 44
End: 2024-05-10

## 2024-05-10 VITALS — SYSTOLIC BLOOD PRESSURE: 113 MMHG | OXYGEN SATURATION: 100 % | DIASTOLIC BLOOD PRESSURE: 75 MMHG | HEART RATE: 78 BPM

## 2024-05-10 DIAGNOSIS — G43.101 MIGRAINE WITH AURA AND WITH STATUS MIGRAINOSUS, NOT INTRACTABLE: Primary | ICD-10-CM

## 2024-05-10 NOTE — PROGRESS NOTES
[facility] NEUROLOGY  [address] [place] [address], [address]  [place] [address]  Phone: [phone] Fax [phone]  ADRIA Melton          Patient: Lakesha Otto  Provider: ADRIA Melton       Procedure note:  Botulinum Toxin injections     Indication: Chronic Migraine        Subjective:      Patient presents for chemodenervation for chronic migraine. She follows Dr. Bravo for medication management.    According to a review of the patient's record, she has suffered from migraines for many years. She has previously described her headaches as throbbing. Some reported symptoms associated with her headaches include transient sensory changes, photophobia, phonophobia, nausea and vomiting. History of visual aura. Clenching teeth with noted jaw pain likely contributing to headaches. Discussed treating masseters and patient in agreement.    Significant improvement noted in severity and frequency of headaches since starting treatments. Has tolerated her previous treatments without difficulty or adverse effects.     Please refer to Dr. Bravo's office note for previous medication trials. Numerous- both preventive and abortive therapy options were unsuccessful in managing the patient's migraines.        Past medical history, surgical history, social history, family history, medications and allergies were all reviewed and updated as appropriate.      Outpatient Encounter Medications as of 5/10/2024   Medication Sig Dispense Refill    Ubrogepant (UBRELVY) 100 MG TABS Take 100 mg by mouth daily as needed (may repeat for 1 dose in 2 hours if needed. Not to exceed 200 mg/24 hours.) 10 tablet 11    norethindrone-ethinyl estradiol (JUNEL FE 1/20) 1-20 MG-MCG per tablet Take 1 tablet by mouth daily 3 packet 4    Levocetirizine Dihydrochloride (XYZAL PO) Take 1 tablet by mouth daily      [] Onabotulinumtoxin A (BOTOX) injection 200 Units        No facility-administered encounter medications on file as

## 2024-06-20 DIAGNOSIS — G43.101 MIGRAINE WITH AURA AND WITH STATUS MIGRAINOSUS, NOT INTRACTABLE: ICD-10-CM

## 2024-06-20 RX ORDER — UBROGEPANT 100 MG/1
100 TABLET ORAL DAILY PRN
Qty: 10 TABLET | Refills: 11 | OUTPATIENT
Start: 2024-06-20

## 2024-06-20 NOTE — TELEPHONE ENCOUNTER
Dr. OSORIO: Pt called asking for a refill of Ubrelvy     Donna: Pt said the Ubrelvy may need a PA.       -ab

## 2024-06-21 RX ORDER — UBROGEPANT 100 MG/1
100 TABLET ORAL DAILY PRN
Qty: 10 TABLET | Refills: 11 | Status: SHIPPED | OUTPATIENT
Start: 2024-06-21

## 2024-07-12 ENCOUNTER — TELEPHONE (OUTPATIENT)
Dept: NEUROLOGY | Age: 44
End: 2024-07-12

## 2024-07-12 DIAGNOSIS — G43.101 MIGRAINE WITH AURA AND WITH STATUS MIGRAINOSUS, NOT INTRACTABLE: Primary | ICD-10-CM

## 2024-07-12 NOTE — TELEPHONE ENCOUNTER
Patient information on fall and injury prevention Please send RX Botox 200 units IM to [facility]- on file   3 refills   Thank you

## 2024-07-15 DIAGNOSIS — G43.101 MIGRAINE WITH AURA AND WITH STATUS MIGRAINOSUS, NOT INTRACTABLE: ICD-10-CM

## 2024-07-16 ENCOUNTER — OFFICE VISIT (OUTPATIENT)
Dept: OBGYN CLINIC | Age: 44
End: 2024-07-16
Payer: COMMERCIAL

## 2024-07-16 VITALS
DIASTOLIC BLOOD PRESSURE: 62 MMHG | SYSTOLIC BLOOD PRESSURE: 102 MMHG | HEIGHT: 64 IN | BODY MASS INDEX: 18.95 KG/M2 | WEIGHT: 111 LBS

## 2024-07-16 DIAGNOSIS — Z01.419 WELL WOMAN EXAM WITH ROUTINE GYNECOLOGICAL EXAM: Primary | ICD-10-CM

## 2024-07-16 DIAGNOSIS — Z12.31 SCREENING MAMMOGRAM FOR BREAST CANCER: ICD-10-CM

## 2024-07-16 PROCEDURE — 99396 PREV VISIT EST AGE 40-64: CPT | Performed by: OBSTETRICS & GYNECOLOGY

## 2024-07-16 RX ORDER — ONABOTULINUMTOXINA 200 [USP'U]/1
INJECTION, POWDER, LYOPHILIZED, FOR SOLUTION INTRADERMAL; INTRAMUSCULAR
COMMUNITY
Start: 2024-04-29

## 2024-07-16 RX ORDER — NORETHINDRONE ACETATE AND ETHINYL ESTRADIOL 1MG-20(21)
1 KIT ORAL DAILY
Qty: 3 PACKET | Refills: 4 | Status: SHIPPED | OUTPATIENT
Start: 2024-07-16

## 2024-07-19 RX ORDER — ONABOTULINUMTOXINA 200 [USP'U]/1
INJECTION, POWDER, LYOPHILIZED, FOR SOLUTION INTRADERMAL; INTRAMUSCULAR
Qty: 1 EACH | Refills: 0 | Status: SHIPPED | OUTPATIENT
Start: 2024-07-19

## 2024-07-24 NOTE — PROGRESS NOTES
7/16/24:  Patient presents today for   Chief Complaint   Patient presents with    Annual Exam       LMP: Patient's last menstrual period was 07/03/2024.  Contraception: OCP (estrogen/progesterone)        Allergies   Allergen Reactions    Codeine Shortness Of Breath    Sumatriptan Hives and Shortness Of Breath    Erythromycin Hives    Hydrocodone-Acetaminophen Hives    Morphine Hives    Oxycodone-Acetaminophen Hives    Tetracycline Hives    Tramadol Hives     Current Outpatient Medications   Medication Sig Dispense Refill    BOTOX 200 units injection       norethindrone-ethinyl estradiol (JUNEL FE 1/20) 1-20 MG-MCG per tablet Take 1 tablet by mouth daily 3 packet 4    Ubrogepant (UBRELVY) 100 MG TABS Take 100 mg by mouth daily as needed (may repeat for 1 dose in 2 hours if needed. Not to exceed 200 mg/24 hours.) 10 tablet 11    Levocetirizine Dihydrochloride (XYZAL PO) Take 1 tablet by mouth daily      Onabotulinumtoxin A (BOTOX) 200 units injection Inject 200 Units into the muscle every 3 months 1 each 3    BOTOX 200 units injection INJECT 200 UNITS INTRAMUSCULARLY ONCE AS DIRECTED 1 each 0     No current facility-administered medications for this visit.     Past Medical History:   Diagnosis Date    Contact dermatitis and other eczema, due to unspecified cause     Headache     migraine    Migraine     Nausea & vomiting     Ovarian cyst     UTI (urinary tract infection)      Past Surgical History:   Procedure Laterality Date    BREAST SURGERY      IMPLANT BREAST SILICONE/EQ Bilateral 2003    KNEE ARTHROSCOPY Left     x 2    ORTHOPEDIC SURGERY      OTHER SURGICAL HISTORY  06/2018    Shoulder Surgery    WISDOM TOOTH EXTRACTION       Social History     Socioeconomic History    Marital status:      Spouse name: Not on file    Number of children: Not on file    Years of education: Not on file    Highest education level: Not on file   Occupational History    Not on file   Tobacco Use    Smoking status: Never

## 2024-08-01 NOTE — PROGRESS NOTES
[facility] NEUROLOGY  [address] [place] [address], [address]  [place] [address]  Phone: [phone] Fax [phone]  ADRIA Melton          Patient: Lakesha Otto  Provider: ADRIA Melton       Procedure note:  Botulinum Toxin injections     Indication: Chronic Migraine        Subjective:      Patient presents for chemodenervation for chronic migraine. She follows Dr. Bravo for medication management.    The patient's last treatment was May 2024. Has tolerated previous treatments without difficulty or adverse effects. Significant improvement in migraine management with regular treatments.    Long history of migraines. Pain described as throbbing. Associated symptoms include transient sensory changes, photophobia, phonophobia, nausea and vomiting. History of visual aura. Clenching teeth with noted jaw pain likely contributing to headaches. Discussed treating masseters and patient in agreement.    Numerous medications, both preventive and abortive therapy options, have previously been unsuccessful in managing migraine symptoms. Please refer to Dr. Bravo's office notes for additional detail.            Past medical history, surgical history, social history, family history, medications and allergies were all reviewed and updated as appropriate.      Outpatient Encounter Medications as of 8/2/2024   Medication Sig Dispense Refill    Onabotulinumtoxin A (BOTOX) 200 units injection Inject 200 Units into the muscle every 3 months 1 each 3    BOTOX 200 units injection INJECT 200 UNITS INTRAMUSCULARLY ONCE AS DIRECTED 1 each 0    BOTOX 200 units injection       norethindrone-ethinyl estradiol (JUNEL FE 1/20) 1-20 MG-MCG per tablet Take 1 tablet by mouth daily 3 packet 4    Ubrogepant (UBRELVY) 100 MG TABS Take 100 mg by mouth daily as needed (may repeat for 1 dose in 2 hours if needed. Not to exceed 200 mg/24 hours.) 10 tablet 11    Levocetirizine Dihydrochloride (XYZAL PO) Take 1 tablet by mouth

## 2024-08-02 ENCOUNTER — OFFICE VISIT (OUTPATIENT)
Dept: NEUROLOGY | Age: 44
End: 2024-08-02

## 2024-08-02 VITALS
HEART RATE: 85 BPM | SYSTOLIC BLOOD PRESSURE: 109 MMHG | DIASTOLIC BLOOD PRESSURE: 69 MMHG | BODY MASS INDEX: 18.95 KG/M2 | WEIGHT: 111 LBS | OXYGEN SATURATION: 96 % | HEIGHT: 64 IN

## 2024-08-02 DIAGNOSIS — G43.101 MIGRAINE WITH AURA AND WITH STATUS MIGRAINOSUS, NOT INTRACTABLE: Primary | ICD-10-CM

## 2024-08-19 ENCOUNTER — HOSPITAL ENCOUNTER (OUTPATIENT)
Dept: MAMMOGRAPHY | Age: 44
Discharge: HOME | End: 2024-08-22
Payer: COMMERCIAL

## 2024-08-19 VITALS — WEIGHT: 109 LBS | BODY MASS INDEX: 18.71 KG/M2

## 2024-08-19 DIAGNOSIS — Z12.31 SCREENING MAMMOGRAM FOR BREAST CANCER: ICD-10-CM

## 2024-08-19 DIAGNOSIS — Z01.419 WELL WOMAN EXAM WITH ROUTINE GYNECOLOGICAL EXAM: ICD-10-CM

## 2024-08-19 PROCEDURE — 77063 BREAST TOMOSYNTHESIS BI: CPT

## 2024-09-11 RX ORDER — NORETHINDRONE ACETATE AND ETHINYL ESTRADIOL 1; 20 MG/1; UG/1
1 TABLET ORAL DAILY
Qty: 84 TABLET | Refills: 4 | OUTPATIENT
Start: 2024-09-11

## 2024-10-14 ENCOUNTER — OFFICE VISIT (OUTPATIENT)
Dept: NEUROLOGY | Age: 44
End: 2024-10-14
Payer: COMMERCIAL

## 2024-10-14 VITALS
BODY MASS INDEX: 18.71 KG/M2 | DIASTOLIC BLOOD PRESSURE: 69 MMHG | HEART RATE: 75 BPM | HEIGHT: 64 IN | SYSTOLIC BLOOD PRESSURE: 112 MMHG

## 2024-10-14 DIAGNOSIS — G43.101 MIGRAINE WITH AURA AND WITH STATUS MIGRAINOSUS, NOT INTRACTABLE: ICD-10-CM

## 2024-10-14 PROCEDURE — 99214 OFFICE O/P EST MOD 30 MIN: CPT | Performed by: PSYCHIATRY & NEUROLOGY

## 2024-10-14 RX ORDER — UBROGEPANT 100 MG/1
100 TABLET ORAL DAILY PRN
Qty: 10 TABLET | Refills: 11 | Status: SHIPPED | OUTPATIENT
Start: 2024-10-14

## 2024-10-14 ASSESSMENT — ENCOUNTER SYMPTOMS
ALLERGIC/IMMUNOLOGIC NEGATIVE: 1
EYES NEGATIVE: 1
RESPIRATORY NEGATIVE: 1
GASTROINTESTINAL NEGATIVE: 1

## 2024-10-14 NOTE — PROGRESS NOTES
good.     Cranial Nerves     CN II   Visual fields full to confrontation.     CN III, IV, VI   Pupils are equal, round, and reactive to light.  Extraocular motions are normal.     CN VII   Facial expression full, symmetric.     Motor Exam   Right arm tone: normal  Left arm tone: normal  Right leg tone: normal  Left leg tone: normal    Gait, Coordination, and Reflexes     Gait  Gait: normal    Tremor   Resting tremor: absent  Intention tremor: absent  Action tremor: absent    Reflexes   Right brachioradialis: 1+  Left brachioradialis: 1+  Right biceps: 1+  Left biceps: 1+  Right triceps: 1+  Left triceps: 1+  Right patellar: 1+  Left patellar: 1+  Right achilles: 1+  Left achilles: 1+          Assessment   Assessment / Plan:    Lakesha was seen today for follow-up and migraine.    Diagnoses and all orders for this visit:    Migraine with aura and with status migrainosus, not intractable the patient went from having 20/month down to 8/month with Botox and currently with the same Botox treatment she is now down to 1 or 2 headaches per month which Ubrelvy 100 mg works very well for which we will refill.        The Diagnosis and differential diagnostic considerations, and Rx Tx were reviewed with the patient at length.           No orders of the defined types were placed in this encounter.         I have spent greater than 50% of visit discussing and counseling of patient  for treatment and diagnostic plan review. Total time30     .      Notes: Patient is to continue all medications as directed by prescribing physicians. Continuations on today's visit are made based on the patient's report of current medications.             Dr. Victoriano Bravo  Consultation Neurology, Neurodiagnostics and Neurotherapeutics  Neuroelectrophysiology, EEG, EMG  [address], [place] [address]  Phone: [phone] Fax [phone]

## 2024-10-15 ENCOUNTER — TELEPHONE (OUTPATIENT)
Dept: NEUROLOGY | Age: 44
End: 2024-10-15

## 2024-10-15 DIAGNOSIS — G43.101 MIGRAINE WITH AURA AND WITH STATUS MIGRAINOSUS, NOT INTRACTABLE: Primary | ICD-10-CM

## 2024-10-29 ENCOUNTER — OFFICE VISIT (OUTPATIENT)
Dept: NEUROLOGY | Age: 44
End: 2024-10-29
Payer: COMMERCIAL

## 2024-10-29 VITALS — DIASTOLIC BLOOD PRESSURE: 69 MMHG | SYSTOLIC BLOOD PRESSURE: 109 MMHG | OXYGEN SATURATION: 98 % | HEART RATE: 93 BPM

## 2024-10-29 DIAGNOSIS — G43.101 MIGRAINE WITH AURA AND WITH STATUS MIGRAINOSUS, NOT INTRACTABLE: Primary | ICD-10-CM

## 2024-10-29 DIAGNOSIS — M26.609 TMJ DYSFUNCTION: ICD-10-CM

## 2024-10-29 PROCEDURE — 64615 CHEMODENERV MUSC MIGRAINE: CPT | Performed by: PSYCHIATRY & NEUROLOGY

## 2024-10-29 NOTE — PROGRESS NOTES
[facility] NEUROLOGY  [address] [place] [address], [address]  [place] [address]  Phone: [phone] Fax [phone]       Botulinum Toxin Injection Procedure Note    Pre-procedure Diagnosis: Chronic migraine headaches     Patient presents for chemodenervation for chronic migraine. Last Botox injection in August 2024.   Tolerates Botox well.     Denies side effects and has had a decrease in migraine frequency and intensity    Indications: same  Improving with decreased intensity of migraines.       Past medical history, surgical history, social history, family history, medications and allergies were all reviewed and updated as appropriate.     Procedure Details   Consent: Written consent obtained after the potential risks and benefits have been explained to the patient.  Potential risks include:  Pain, bruising, bleeding, infection, flu-like symptoms, over-weakening of injected or adjacent muscles and swallowing dysfunction. Patient was given the botulinum toxin medication guide according to FDA standards.    Technique: Botox A 200 unit was dissolved into non-preservative normal saline 4 ml. Gauge 30 facial needles were used for the injection.     Bilateral Corrugators: 5 units each side (total 10 units)  Procerus: 5 units  Bilateral frontalis: 5 units at 4 sites ( total 20 units)   Bilateral temporalis: 20 units divided into 2 sites each side (total 40 units)  Bilateral occipitalis: 15 units divided into 2 sites each side (total 30 units)  Bilateral cervical paraspinals: 10 units divided into 2 ites each side (total 20 units)   Bilateral trapezius: 15 units divided into 3 sites, each side ( total 30 units)  Bilateral masseter : 5 units each side ----- will consider increasing next visit depending on patient's response    Total injected: 165 BOTOX 100units/2 cc.   Waste: 35 units     Comments: There were no complications.  The patient tolerated the procedure well.     - RTC 3 months for review and re-injection.

## 2024-11-12 ENCOUNTER — CLINICAL DOCUMENTATION (OUTPATIENT)
Dept: NEUROLOGY | Age: 44
End: 2024-11-12

## 2024-11-12 NOTE — PROGRESS NOTES
She had a reaction to Botox but she has been getting it for just shy of a year so organ to continue to try it if the symptoms that she has gets worse she will let us know immediately

## 2025-01-28 ENCOUNTER — OFFICE VISIT (OUTPATIENT)
Dept: NEUROLOGY | Age: 45
End: 2025-01-28
Payer: COMMERCIAL

## 2025-01-28 VITALS — SYSTOLIC BLOOD PRESSURE: 124 MMHG | OXYGEN SATURATION: 99 % | DIASTOLIC BLOOD PRESSURE: 80 MMHG | HEART RATE: 84 BPM

## 2025-01-28 DIAGNOSIS — G43.101 MIGRAINE WITH AURA AND WITH STATUS MIGRAINOSUS, NOT INTRACTABLE: Primary | ICD-10-CM

## 2025-01-28 PROCEDURE — 64615 CHEMODENERV MUSC MIGRAINE: CPT | Performed by: PSYCHIATRY & NEUROLOGY

## 2025-01-28 NOTE — PROGRESS NOTES
[facility] NEUROLOGY  [address] [place] [address], [address]  [place] [address]  Phone: [phone] Fax [phone]       Botulinum Toxin Injection Procedure Note    Pre-procedure Diagnosis: Chronic migraine headaches     Patient presents for chemodenervation for chronic migraine. Last Botox injection in October 2024.   She doesn't mention any side effects to me from the last injection.  She desires to inject more in her masseter.   Denies side effects and has had a decrease in migraine frequency and intensity    Indications: same  Improving with decreased intensity of migraines.       Past medical history, surgical history, social history, family history, medications and allergies were all reviewed and updated as appropriate.     Procedure Details   Consent: Written consent obtained after the potential risks and benefits have been explained to the patient.  Potential risks include:  Pain, bruising, bleeding, infection, flu-like symptoms, over-weakening of injected or adjacent muscles and swallowing dysfunction. Patient was given the botulinum toxin medication guide according to FDA standards.    Technique: Botox A 200 unit was dissolved into non-preservative normal saline 4 ml. Gauge 30 facial needles were used for the injection.     Bilateral Corrugators: 5 units each side (total 10 units)  Procerus: 5 units  Bilateral frontalis: 5 units at 4 sites ( total 20 units)   Bilateral temporalis: 20 units divided into 2 sites each side (total 40 units)  Bilateral occipitalis: 15 units divided into 2 sites each side (total 30 units)  Bilateral cervical paraspinals: 10 units divided into 2 ites each side (total 20 units)   Bilateral trapezius: 15 units divided into 3 sites, each side ( total 30 units)  Bilateral masseter : 7.5 units each side   Total injected: 170 BOTOX 100units/2 cc.   Waste: 30 units     Comments: There were no complications.  The patient tolerated the procedure well.     - RTC 3 months for review and

## 2025-04-22 ENCOUNTER — OFFICE VISIT (OUTPATIENT)
Dept: NEUROLOGY | Age: 45
End: 2025-04-22
Payer: COMMERCIAL

## 2025-04-22 VITALS — SYSTOLIC BLOOD PRESSURE: 108 MMHG | DIASTOLIC BLOOD PRESSURE: 74 MMHG | OXYGEN SATURATION: 98 % | HEART RATE: 80 BPM

## 2025-04-22 DIAGNOSIS — G43.101 MIGRAINE WITH AURA AND WITH STATUS MIGRAINOSUS, NOT INTRACTABLE: Primary | ICD-10-CM

## 2025-04-22 PROCEDURE — 64615 CHEMODENERV MUSC MIGRAINE: CPT | Performed by: PSYCHIATRY & NEUROLOGY

## 2025-04-22 NOTE — PROGRESS NOTES
[facility] NEUROLOGY  [address], [address]  [place] [address]  Phone: [phone] Fax [phone]       Botulinum Toxin Injection Procedure Note    Pre-procedure Diagnosis: Chronic migraine headaches     Patient presents for chemodenervation for chronic migraine.Last injection with me in January.   Tolerates procedure well.  Denies side effects and has had a decrease in migraine frequency and intensity    Indications: same  Improving with decreased intensity of migraines.       Past medical history, surgical history, social history, family history, medications and allergies were all reviewed and updated as appropriate.     Procedure Details   Consent: Written consent obtained after the potential risks and benefits have been explained to the patient.  Potential risks include:  Pain, bruising, bleeding, infection, flu-like symptoms, over-weakening of injected or adjacent muscles and swallowing dysfunction. Patient was given the botulinum toxin medication guide according to FDA standards.    Technique: Botox A 200 unit was dissolved into non-preservative normal saline 4 ml. Gauge 30 facial needles were used for the injection.     Bilateral Corrugators: 5 units each side (total 10 units)  Procerus: 5 units  Bilateral frontalis: 5 units at 4 sites ( total 20 units)   Bilateral temporalis: 20 units divided into 2 sites each side (total 40 units)  Bilateral occipitalis: 15 units divided into 2 sites each side (total 30 units)  Bilateral cervical paraspinals: 10 units divided into 2 ites each side (total 20 units)   Bilateral trapezius: 15 units divided into 3 sites, each side ( total 30 units)  Bilateral masseter : 7.5 units each side   Total injected: 170 BOTOX 100units/2 cc.   Waste: 30 units     Comments: There were no complications.  The patient tolerated the procedure well.     - RTC 3 months for review and re-injection.      Signature: Erica Reed MD  [facility] Neurology   Dada Room

## 2025-06-02 ENCOUNTER — RX ONLY (RX ONLY)
Age: 45
End: 2025-06-02

## 2025-06-02 ENCOUNTER — APPOINTMENT (OUTPATIENT)
Dept: URBAN - METROPOLITAN AREA CLINIC 330 | Facility: CLINIC | Age: 45
Setting detail: DERMATOLOGY
End: 2025-06-02

## 2025-06-02 DIAGNOSIS — L20.89 OTHER ATOPIC DERMATITIS: ICD-10-CM | Status: STABLE

## 2025-06-02 DIAGNOSIS — L21.8 OTHER SEBORRHEIC DERMATITIS: ICD-10-CM | Status: INADEQUATELY CONTROLLED

## 2025-06-02 PROCEDURE — ? PRESCRIPTION MEDICATION MANAGEMENT

## 2025-06-02 PROCEDURE — ? MDM - TREATMENT GOALS

## 2025-06-02 PROCEDURE — 99214 OFFICE O/P EST MOD 30 MIN: CPT

## 2025-06-02 PROCEDURE — ? COUNSELING

## 2025-06-02 PROCEDURE — ? PRESCRIPTION

## 2025-06-02 RX ORDER — KETOCONAZOLE 20 MG/ML
SHAMPOO, SUSPENSION TOPICAL AS DIRECTED
Qty: 120 | Refills: 5 | Status: ERX | COMMUNITY
Start: 2025-06-02

## 2025-06-02 RX ORDER — RUXOLITINIB 15 MG/G
CREAM TOPICAL
Qty: 60 | Refills: 5 | Status: ERX

## 2025-06-02 RX ADMIN — KETOCONAZOLE: 20 SHAMPOO, SUSPENSION TOPICAL at 00:00

## 2025-06-02 ASSESSMENT — LOCATION DETAILED DESCRIPTION DERM
LOCATION DETAILED: RIGHT INFERIOR LATERAL NECK
LOCATION DETAILED: LEFT VENTRAL PROXIMAL FOREARM
LOCATION DETAILED: LEFT SUPERIOR CENTRAL MALAR CHEEK
LOCATION DETAILED: RIGHT SUPERIOR PARIETAL SCALP
LOCATION DETAILED: LEFT INFERIOR LATERAL NECK
LOCATION DETAILED: RIGHT SUPERIOR CENTRAL MALAR CHEEK
LOCATION DETAILED: RIGHT ANTECUBITAL SKIN

## 2025-06-02 ASSESSMENT — LOCATION ZONE DERM
LOCATION ZONE: NECK
LOCATION ZONE: ARM
LOCATION ZONE: FACE
LOCATION ZONE: SCALP

## 2025-06-02 ASSESSMENT — BSA RASH: BSA RASH: 0

## 2025-06-02 ASSESSMENT — SEVERITY ASSESSMENT 2020: SEVERITY 2020: ALMOST CLEAR

## 2025-06-02 ASSESSMENT — LOCATION SIMPLE DESCRIPTION DERM
LOCATION SIMPLE: RIGHT CHEEK
LOCATION SIMPLE: LEFT FOREARM
LOCATION SIMPLE: RIGHT UPPER ARM
LOCATION SIMPLE: SCALP
LOCATION SIMPLE: LEFT CHEEK
LOCATION SIMPLE: RIGHT ANTERIOR NECK
LOCATION SIMPLE: LEFT ANTERIOR NECK

## 2025-06-02 ASSESSMENT — ITCH NUMERIC RATING SCALE: HOW SEVERE IS YOUR ITCHING?: 0

## 2025-06-02 NOTE — PROCEDURE: PRESCRIPTION MEDICATION MANAGEMENT
Detail Level: Zone
Continue Regimen: Hydrocortisone 2.5% apply to affected areas bid x2wks prn flares\\n\\nOpzelura
Plan: Pt states condition is stable with Opzelura and Hydrocortisone 2.5%. Refills sent.
Render In Strict Bullet Format?: No
Plan: Pt states itchy, flaking scalp. This has been going on for a year now and will come and go.\\nPt has tried OTC products with no relief.\\nRecommended Ketoconazole shampoo, pt agrees with plan.
Initiate Treatment: ketoconazole 2 % shampoo

## 2025-06-02 NOTE — HPI: ITCHING
What Type Of Note Output Would You Prefer (Optional)?: Bullet Format
Additional History: Itching and flaking

## 2025-06-06 ENCOUNTER — HOSPITAL ENCOUNTER (OUTPATIENT)
Dept: LAB | Age: 45
Setting detail: SPECIMEN
Discharge: HOME | End: 2025-06-08

## 2025-06-06 PROCEDURE — 36415 COLL VENOUS BLD VENIPUNCTURE: CPT

## 2025-06-06 PROCEDURE — 86762 RUBELLA ANTIBODY: CPT

## 2025-06-06 PROCEDURE — 86765 RUBEOLA ANTIBODY: CPT

## 2025-06-06 PROCEDURE — 86735 MUMPS ANTIBODY: CPT

## 2025-06-06 PROCEDURE — 86787 VARICELLA-ZOSTER ANTIBODY: CPT

## 2025-07-14 SDOH — ECONOMIC STABILITY: FOOD INSECURITY: WITHIN THE PAST 12 MONTHS, YOU WORRIED THAT YOUR FOOD WOULD RUN OUT BEFORE YOU GOT MONEY TO BUY MORE.: NEVER TRUE

## 2025-07-14 SDOH — ECONOMIC STABILITY: INCOME INSECURITY: IN THE LAST 12 MONTHS, WAS THERE A TIME WHEN YOU WERE NOT ABLE TO PAY THE MORTGAGE OR RENT ON TIME?: NO

## 2025-07-14 SDOH — ECONOMIC STABILITY: TRANSPORTATION INSECURITY
IN THE PAST 12 MONTHS, HAS THE LACK OF TRANSPORTATION KEPT YOU FROM MEDICAL APPOINTMENTS OR FROM GETTING MEDICATIONS?: YES

## 2025-07-14 SDOH — ECONOMIC STABILITY: FOOD INSECURITY: WITHIN THE PAST 12 MONTHS, THE FOOD YOU BOUGHT JUST DIDN'T LAST AND YOU DIDN'T HAVE MONEY TO GET MORE.: NEVER TRUE

## 2025-07-15 ENCOUNTER — OFFICE VISIT (OUTPATIENT)
Dept: NEUROLOGY | Age: 45
End: 2025-07-15

## 2025-07-15 DIAGNOSIS — G43.101 MIGRAINE WITH AURA AND WITH STATUS MIGRAINOSUS, NOT INTRACTABLE: Primary | ICD-10-CM

## 2025-07-15 NOTE — PROGRESS NOTES
Echo orders placed [facility] NEUROLOGY  [address] [place] [address], [address]  [place] [address]  Phone: [phone] Fax [phone]       Botulinum Toxin Injection Procedure Note    Pre-procedure Diagnosis: Chronic migraine headaches     Patient presents for chemodenervation for chronic migraine. She reports increased amount of migraines lately.  She is not on any preventative therapy except for Botox.  She does utilize Ubrelvy as needed.  She is scheduled to see Dr. Bravo in October.  Encouraged her to reach out to him to discuss adding on a different medicine to help her with headaches.  Tolerates procedure well.  Denies side effects and has had a decrease in migraine frequency and intensity    Indications: same  Improving with decreased intensity of migraines.       Past medical history, surgical history, social history, family history, medications and allergies were all reviewed and updated as appropriate.     Procedure Details   Consent: Written consent obtained after the potential risks and benefits have been explained to the patient.  Potential risks include:  Pain, bruising, bleeding, infection, flu-like symptoms, over-weakening of injected or adjacent muscles and swallowing dysfunction. Patient was given the botulinum toxin medication guide according to FDA standards.    Technique: Botox A 200 unit was dissolved into non-preservative normal saline 4 ml. Gauge 30 facial needles were used for the injection.     Bilateral Corrugators: 5 units each side (total 10 units)  Procerus: 5 units  Bilateral frontalis: 5 units at 4 sites ( total 20 units)   Bilateral temporalis: 20 units divided into 2 sites each side (total 40 units)  Bilateral occipitalis: 15 units divided into 2 sites each side (total 30 units)  Bilateral cervical paraspinals: 10 units divided into 2 ites each side (total 20 units)   Bilateral trapezius: 15 units divided into 3 sites, each side ( total 30 units)  Bilateral masseter : 7.5 units each side   Total

## 2025-07-17 ENCOUNTER — OFFICE VISIT (OUTPATIENT)
Dept: OBGYN CLINIC | Age: 45
End: 2025-07-17
Payer: COMMERCIAL

## 2025-07-17 VITALS
SYSTOLIC BLOOD PRESSURE: 92 MMHG | WEIGHT: 109 LBS | HEIGHT: 64 IN | DIASTOLIC BLOOD PRESSURE: 58 MMHG | BODY MASS INDEX: 18.61 KG/M2

## 2025-07-17 DIAGNOSIS — Z12.4 SCREENING FOR CERVICAL CANCER: ICD-10-CM

## 2025-07-17 DIAGNOSIS — Z11.51 SCREENING FOR HPV (HUMAN PAPILLOMAVIRUS): ICD-10-CM

## 2025-07-17 DIAGNOSIS — Z01.419 WELL WOMAN EXAM WITH ROUTINE GYNECOLOGICAL EXAM: Primary | ICD-10-CM

## 2025-07-17 DIAGNOSIS — Z12.31 ENCOUNTER FOR SCREENING MAMMOGRAM FOR MALIGNANT NEOPLASM OF BREAST: ICD-10-CM

## 2025-07-17 PROCEDURE — 99396 PREV VISIT EST AGE 40-64: CPT | Performed by: OBSTETRICS & GYNECOLOGY

## 2025-07-17 RX ORDER — NORETHINDRONE ACETATE AND ETHINYL ESTRADIOL 1MG-20(21)
1 KIT ORAL DAILY
Qty: 3 PACKET | Refills: 4 | Status: SHIPPED | OUTPATIENT
Start: 2025-07-17

## 2025-07-21 LAB
COLLECTION METHOD: NORMAL
CYTOLOGIST CVX/VAG CYTO: NORMAL
CYTOLOGY CVX/VAG DOC THIN PREP: NORMAL
HPV APTIMA: NEGATIVE
Lab: NORMAL
Lab: NORMAL
PAP SOURCE: NORMAL
PATH REPORT.FINAL DX SPEC: NORMAL
STAT OF ADQ CVX/VAG CYTO-IMP: NORMAL

## 2025-08-20 ENCOUNTER — HOSPITAL ENCOUNTER (OUTPATIENT)
Dept: MAMMOGRAPHY | Age: 45
Discharge: HOME | End: 2025-08-23
Payer: COMMERCIAL

## 2025-08-20 DIAGNOSIS — Z12.31 ENCOUNTER FOR SCREENING MAMMOGRAM FOR MALIGNANT NEOPLASM OF BREAST: ICD-10-CM

## 2025-08-20 PROCEDURE — 77067 SCR MAMMO BI INCL CAD: CPT

## (undated) DEVICE — RESTRAINT UNIVERSAL HEAD DISP --

## (undated) DEVICE — NEEDLE HYPO 18GA L1.5IN PNK S STL HUB POLYPR SHLD REG BVL

## (undated) DEVICE — SET IRRIG DST FLX M CONN

## (undated) DEVICE — NDL SPNE QNCKE 18GX3.5IN LF --

## (undated) DEVICE — (D)PREP SKN CHLRAPRP APPL 26ML -- CONVERT TO ITEM 371833

## (undated) DEVICE — MASTISOL ADHESIVE LIQ 2/3ML

## (undated) DEVICE — DRAPE,SHOULDER,BEACH CHAIR,STERILE: Brand: MEDLINE

## (undated) DEVICE — STOCKINETTE,IMPERVIOUS,12X48,STERILE: Brand: MEDLINE

## (undated) DEVICE — SET IRRIG W 96IN TBNG 4 LN FLX BG

## (undated) DEVICE — SOFT SILICONE HYDROCELLULAR FOAM DRESSING WITH LOCK AWAY LAYER: Brand: ALLEVYN LIFE XL 21X21 CTN10

## (undated) DEVICE — SLING ORTH AD L50IN 90 225LB EL TO WRST SFT COT SPANDEX

## (undated) DEVICE — SURGICAL PROCEDURE PACK BASIC ST FRANCIS

## (undated) DEVICE — KENDALL SCD EXPRESS SLEEVES, KNEE LENGTH, MEDIUM: Brand: KENDALL SCD

## (undated) DEVICE — SYR 50ML LR LCK 1ML GRAD NSAF --

## (undated) DEVICE — BUTTON SWITCH PENCIL BLADE ELECTRODE, HOLSTER: Brand: EDGE

## (undated) DEVICE — INTENDED FOR TISSUE SEPARATION, AND OTHER PROCEDURES THAT REQUIRE A SHARP SURGICAL BLADE TO PUNCTURE OR CUT.: Brand: BARD-PARKER ® STAINLESS STEEL BLADES

## (undated) DEVICE — SUTURE MCRYL SZ 3-0 L27IN ABSRB UD L19MM PS-2 3/8 CIR PRIM Y427H

## (undated) DEVICE — [AGGRESSIVE 6-FLUTE BARREL BUR, ARTHROSCOPIC SHAVER BLADE,  DO NOT RESTERILIZE,  DO NOT USE IF PACKAGE IS DAMAGED,  KEEP DRY,  KEEP AWAY FROM SUNLIGHT]: Brand: FORMULA

## (undated) DEVICE — 3M™ COBAN™ SELF-ADHERENT WRAP, 1586S, STERILE, 6 IN X 5 YD (15 CM X 4,5 M), 12 ROLLS/CASE: Brand: 3M™ COBAN™

## (undated) DEVICE — SINGLE PORT MANIFOLD: Brand: NEPTUNE 2

## (undated) DEVICE — [RESECTOR CUTTER, ARTHROSCOPIC SHAVER BLADE,  DO NOT RESTERILIZE,  DO NOT USE IF PACKAGE IS DAMAGED,  KEEP DRY,  KEEP AWAY FROM SUNLIGHT]: Brand: FORMULA

## (undated) DEVICE — SOLUTION IRRIG 3000ML 0.9% SOD CHL FLX CONT 0797208] ICU MEDICAL INC]

## (undated) DEVICE — SUTURE VCRL SZ 0 L27IN ABSRB VLT L26MM CT-2 1/2 CIR J334H

## (undated) DEVICE — 90-S ACCELERATOR, SUCTION PROBE, NON-BENDABLE, MAX CUT LEVEL 11: Brand: SERFAS ENERGY

## (undated) DEVICE — REM POLYHESIVE ADULT PATIENT RETURN ELECTRODE: Brand: VALLEYLAB

## (undated) DEVICE — (D)STRIP SKN CLSR 0.5X4IN WHT --